# Patient Record
Sex: FEMALE | Race: OTHER | Employment: UNEMPLOYED | URBAN - NONMETROPOLITAN AREA
[De-identification: names, ages, dates, MRNs, and addresses within clinical notes are randomized per-mention and may not be internally consistent; named-entity substitution may affect disease eponyms.]

---

## 2020-06-08 ENCOUNTER — HOSPITAL ENCOUNTER (EMERGENCY)
Age: 25
Discharge: HOME | End: 2020-06-08
Payer: MEDICAID

## 2020-06-08 VITALS
HEART RATE: 75 BPM | WEIGHT: 127 LBS | RESPIRATION RATE: 16 BRPM | TEMPERATURE: 97.8 F | OXYGEN SATURATION: 98 % | BODY MASS INDEX: 27.4 KG/M2 | HEIGHT: 57 IN | SYSTOLIC BLOOD PRESSURE: 138 MMHG | DIASTOLIC BLOOD PRESSURE: 76 MMHG

## 2020-06-08 LAB
BILIRUBIN URINE: NEGATIVE
BLOOD, URINE: ABNORMAL
CHARACTER, URINE: CLEAR
COLOR: YELLOW
GLUCOSE URINE: NEGATIVE MG/DL
KETONES, URINE: NEGATIVE
LEUKOCYTE ESTERASE, URINE: ABNORMAL
NITRITE, URINE: NEGATIVE
PH UA: 6 (ref 5–9)
PROTEIN UA: NEGATIVE MG/DL
SPECIFIC GRAVITY UA: 1.01 (ref 1–1.03)
UROBILINOGEN, URINE: 0.2 EU/DL (ref 0.2–1)

## 2020-06-08 PROCEDURE — 99203 OFFICE O/P NEW LOW 30 MIN: CPT | Performed by: NURSE PRACTITIONER

## 2020-06-08 PROCEDURE — 99203 OFFICE O/P NEW LOW 30 MIN: CPT

## 2020-06-08 PROCEDURE — 81003 URINALYSIS AUTO W/O SCOPE: CPT

## 2020-06-08 PROCEDURE — 87086 URINE CULTURE/COLONY COUNT: CPT

## 2020-06-08 RX ORDER — ETONOGESTREL 68 MG/1
68 IMPLANT SUBCUTANEOUS ONCE
Status: ON HOLD | COMMUNITY
End: 2022-02-23

## 2020-06-08 RX ORDER — CIPROFLOXACIN 500 MG/1
500 TABLET, FILM COATED ORAL 2 TIMES DAILY
Qty: 10 TABLET | Refills: 0 | Status: SHIPPED | OUTPATIENT
Start: 2020-06-08 | End: 2020-06-13

## 2020-06-08 ASSESSMENT — PAIN SCALES - GENERAL: PAINLEVEL_OUTOF10: 5

## 2020-06-08 ASSESSMENT — ENCOUNTER SYMPTOMS
SHORTNESS OF BREATH: 0
COUGH: 0
VOMITING: 0
NAUSEA: 1
DIARRHEA: 0
BACK PAIN: 1
ABDOMINAL PAIN: 0

## 2020-06-08 ASSESSMENT — PAIN DESCRIPTION - ORIENTATION: ORIENTATION: RIGHT

## 2020-06-08 ASSESSMENT — PAIN DESCRIPTION - LOCATION: LOCATION: BACK;FLANK

## 2020-06-08 NOTE — ED PROVIDER NOTES
Urine Trace-lysed NEGATIVE    pH, UA 6.00 5.0 - 9.0    Protein, UA Negative NEGATIVE mg/dl    Urobilinogen, Urine 0.20 0.2 - 1.0 eu/dl    Nitrite, Urine Negative NEGATIVE    Leukocyte Esterase, Urine Small (A) NEGATIVE    Color, UA Yellow STRAW-YELL    Character, Urine Clear CLEAR-SL C       IMAGING:    No orders to display         EKG:  none    URGENT CARE COURSE:     Vitals:    06/08/20 1328   BP: 138/76   Pulse: 75   Resp: 16   Temp: 97.8 °F (36.6 °C)   TempSrc: Infrared   SpO2: 98%   Weight: 127 lb (57.6 kg)   Height: 4' 8.5\" (1.435 m)       Medications - No data to display         PROCEDURES:  None    FINAL IMPRESSION      1. Acute cystitis with hematuria          DISPOSITION/ PLAN     Discussed with the patient that urine sample does demonstrate the possibility of urinary tract infection at this time. Discussed plan to treat with oral antibiotics and she is advised to push fluids at home. I did discuss with the patient that if her symptoms seem to worsen or change in any way that she should present to the emergency department for further evaluation of kidney stone or other complication. Patient is agreeable to plan as discussed and will follow-up as needed on an outpatient basis. PATIENT REFERRED TO:  No primary care provider on file. No primary physician on file.       DISCHARGE MEDICATIONS:  New Prescriptions    CIPROFLOXACIN (CIPRO) 500 MG TABLET    Take 1 tablet by mouth 2 times daily for 5 days       Discontinued Medications    No medications on file       Current Discharge Medication List          GABBIE Garcia CNP    (Please note that portions of this note were completed with a voice recognition program. Efforts were made to edit the dictations but occasionally words are mis-transcribed.)          GABBIE Garcia CNP  06/08/20 [id number]

## 2020-06-10 LAB
ORGANISM: ABNORMAL
URINE CULTURE, ROUTINE: ABNORMAL

## 2021-05-21 ENCOUNTER — NURSE ONLY (OUTPATIENT)
Dept: LAB | Age: 26
End: 2021-05-21

## 2021-05-21 LAB — HCG,BETA SUBUNIT,QUAL,SERUM: < 0.1 MIU/ML (ref 0–5)

## 2021-05-26 ENCOUNTER — NURSE ONLY (OUTPATIENT)
Dept: LAB | Age: 26
End: 2021-05-26

## 2021-05-29 LAB
CARDIOLIPIN AB IGM: < 10 MPL (ref 0–12)
CARDIOLIPIN ANTIBODY, IGG: < 10 GPL (ref 0–14)
HSV TYPE 2 GLYCOPROTEIN G-SPECIFIC AB, IGG: 0.13 IV

## 2021-05-30 LAB
DRVVT 1:1 MIX: ABNORMAL SEC (ref 33–44)
DRVVT CONFIRMATION TEST: ABNORMAL RATIO
DRVVT SCREEN: 22 SEC (ref 33–44)
HEXAGONAL PHOSPHOLIPID NEUTRALIZAT TEST: ABNORMAL
LUPUS ANTICOAG INTERP: ABNORMAL
PLATELET NEUTRALIZATION: ABNORMAL
PROTEIN C ANTIGEN: > 95 % (ref 63–153)
PROTEIN S ANTIGEN, TOTAL: 101 % (ref 63–126)
PROTHROMBIN TIME: 12.1 SEC (ref 12–15.5)
PTT 1:1 MIX: ABNORMAL SEC (ref 32–48)
PTT LUPUS ANTICOAGULANT: 36 SEC (ref 32–48)
PTT-HEPARIN NEUTRALIZED: ABNORMAL SEC (ref 32–48)
REPTILASE TIME: ABNORMAL SEC
THROMBIN TIME: ABNORMAL SEC (ref 14.7–19.5)

## 2021-06-01 LAB — MISC. #1 REFERENCE GROUP TEST: NORMAL

## 2021-06-04 LAB — MISC. #1 REFERENCE GROUP TEST: NORMAL

## 2021-07-14 ENCOUNTER — NURSE ONLY (OUTPATIENT)
Dept: LAB | Age: 26
End: 2021-07-14

## 2021-07-14 LAB
HCG,BETA SUBUNIT,QUAL,SERUM: 17.9 MIU/ML (ref 0–5)
PROGESTERONE LEVEL: 11.85 NG/ML

## 2021-07-16 ENCOUNTER — NURSE ONLY (OUTPATIENT)
Dept: LAB | Age: 26
End: 2021-07-16

## 2021-07-16 LAB — HCG,BETA SUBUNIT,QUAL,SERUM: 61.8 MIU/ML (ref 0–5)

## 2021-08-16 ENCOUNTER — NURSE ONLY (OUTPATIENT)
Dept: LAB | Age: 26
End: 2021-08-16

## 2021-08-20 LAB
CHLAMYDIA TRACHOMATIS AMPLIFIED DET: NEGATIVE
NEISSERIA GONORRHOEAE BY AMP: NEGATIVE
SPECIMEN SOURCE: NORMAL

## 2021-08-21 LAB
APTIMA MEDIA TYPE: NORMAL
T. VAGINALIS SPECIMEN SOURCE: NORMAL
TRICHOMONAS VAGINALIS BY NAA: NEGATIVE

## 2022-01-07 ENCOUNTER — HOSPITAL ENCOUNTER (OUTPATIENT)
Age: 27
Discharge: HOME | End: 2022-01-07
Attending: OBSTETRICS & GYNECOLOGY | Admitting: OBSTETRICS & GYNECOLOGY
Payer: MEDICARE

## 2022-01-07 VITALS
RESPIRATION RATE: 16 BRPM | TEMPERATURE: 98.1 F | HEART RATE: 83 BPM | OXYGEN SATURATION: 97 % | SYSTOLIC BLOOD PRESSURE: 111 MMHG | DIASTOLIC BLOOD PRESSURE: 74 MMHG

## 2022-01-07 PROBLEM — O36.8131: Status: ACTIVE | Noted: 2022-01-07

## 2022-01-07 PROCEDURE — 59025 FETAL NON-STRESS TEST: CPT

## 2022-01-08 NOTE — PLAN OF CARE
Problem: Healthcare acquired conditions:  Goal: Absence of healthcare acquired conditions  Description: Absence of healthcare acquired conditions  Outcome: Ongoing  Note: Absence of healthcare acquired conditions      Problem: Discharge Planning:  Goal: Discharged to appropriate level of care  Description: Discharged to appropriate level of care  Outcome: Ongoing  Note: Plan of care discussed with pt      Care plan reviewed with patient and s/o. Patient and s/o verbalize understanding of the plan of care and contribute to goal setting.

## 2022-01-08 NOTE — FLOWSHEET NOTE
, 29 weeks, pt of Dr. Laney Sullivan here with c/o decreased fetal movement. Pt states she had a lot of fetal movement around 4801-0176 today and states baby has not been real active since. Pt states last fetal movement she felt was around 1800 today. Denies ctx, denies leaking of fluid or vaginal bleeding. Denies other complaints at this time. Fetal monitors applied to soft non tender abdomen.

## 2022-01-08 NOTE — FLOWSHEET NOTE
Dr. Carol Ann Garrett in department. Informed of pt arrival to unit. , 29 weeks, here with c/o decreased fetal movement. Reactive NST. No other complaints. Orders received to discharge pt to home.

## 2022-01-08 NOTE — FLOWSHEET NOTE
Discharge instructions given, pt verbalized understanding. Pt ambulated off unit with s/o at side. Discharged to home undelivered.

## 2022-01-10 NOTE — PROCEDURES
Department of Obstetrics and Gynecology  Labor and Delivery  NST Triage Note      Pt Name: Maria Del Carmen Moreno  MRN: [id number] Kimcaio #: [de-identified]  YOB: 1995  Procedure Performed By: Rodolfo Palomo MD, MD        SUBJECTIVE: Patient presented at 29 weeks complaining of decreased fetal movement. denies contraction, vaginal bleeding and leaking fluid. OBJECTIVE    Vitals:  /74   Pulse 83   Temp 98.1 °F (36.7 °C) (Temporal)   Resp 16   SpO2 97%     Fetal heart rate:         Baseline Heart Rate:  130        Accelerations:  present       Decelerations:  absent       Variability:  moderate    Contraction frequency: none    The NST was reactive. ASSESSMENT & PLAN:  Reassurance provided. Labor precautions given. Discharged to home in a stable condition and instructed to follow up at her next scheduled appointment.     Rodolfo Palomo MD 1/10/2022 11:30 AM

## 2022-01-12 ENCOUNTER — NURSE ONLY (OUTPATIENT)
Dept: LAB | Age: 27
End: 2022-01-12

## 2022-01-12 LAB
GLUCOSE FASTING: 103 MG/DL (ref 69–105)
GLUCOSE TOLERANCE TEST 1 HOUR: 190 MG/DL (ref 120–170)
GLUCOSE TOLERANCE TEST 2 HOUR: 203 MG/DL (ref 70–120)
GLUCOSE TOLERANCE TEST 3 HOUR: 167 MG/DL (ref 70–120)

## 2022-02-11 ENCOUNTER — HOSPITAL ENCOUNTER (OUTPATIENT)
Age: 27
Discharge: HOME | End: 2022-02-11
Attending: OBSTETRICS & GYNECOLOGY | Admitting: OBSTETRICS & GYNECOLOGY
Payer: MEDICARE

## 2022-02-11 VITALS
BODY MASS INDEX: 31.5 KG/M2 | SYSTOLIC BLOOD PRESSURE: 140 MMHG | TEMPERATURE: 100 F | HEIGHT: 57 IN | RESPIRATION RATE: 18 BRPM | WEIGHT: 146 LBS | HEART RATE: 96 BPM | DIASTOLIC BLOOD PRESSURE: 87 MMHG

## 2022-02-11 PROBLEM — O26.90 PREGNANCY COMPLICATION, ANTEPARTUM: Status: ACTIVE | Noted: 2022-02-11

## 2022-02-11 LAB
ALBUMIN SERPL-MCNC: 3.5 G/DL (ref 3.5–5.1)
ALP BLD-CCNC: 127 U/L (ref 38–126)
ALT SERPL-CCNC: 17 U/L (ref 11–66)
ANION GAP SERPL CALCULATED.3IONS-SCNC: 16 MEQ/L (ref 8–16)
AST SERPL-CCNC: 22 U/L (ref 5–40)
BACTERIA: ABNORMAL /HPF
BETA-HYDROXYBUTYRATE: 9.48 MG/DL (ref 0.2–2.81)
BILIRUB SERPL-MCNC: 0.2 MG/DL (ref 0.3–1.2)
BILIRUBIN URINE: NEGATIVE
BLOOD, URINE: NEGATIVE
BUN BLDV-MCNC: 6 MG/DL (ref 7–22)
CALCIUM SERPL-MCNC: 8.9 MG/DL (ref 8.5–10.5)
CASTS 2: ABNORMAL /LPF
CASTS UA: ABNORMAL /LPF
CHARACTER, URINE: CLEAR
CHLORIDE BLD-SCNC: 105 MEQ/L (ref 98–111)
CO2: 22 MEQ/L (ref 23–33)
COLOR: YELLOW
CREAT SERPL-MCNC: 0.4 MG/DL (ref 0.4–1.2)
CREATININE URINE: 175.2 MG/DL
CRYSTALS, UA: ABNORMAL
EPITHELIAL CELLS, UA: ABNORMAL /HPF
ERYTHROCYTE [DISTWIDTH] IN BLOOD BY AUTOMATED COUNT: 14.3 % (ref 11.5–14.5)
ERYTHROCYTE [DISTWIDTH] IN BLOOD BY AUTOMATED COUNT: 44.7 FL (ref 35–45)
GFR SERPL CREATININE-BSD FRML MDRD: > 90 ML/MIN/1.73M2
GLUCOSE BLD-MCNC: 71 MG/DL (ref 70–108)
GLUCOSE BLD-MCNC: 83 MG/DL (ref 70–108)
GLUCOSE URINE: NEGATIVE MG/DL
HCT VFR BLD CALC: 35.8 % (ref 37–47)
HEMOGLOBIN: 11.9 GM/DL (ref 12–16)
KETONES, URINE: >= 160
LEUKOCYTE ESTERASE, URINE: NEGATIVE
MCH RBC QN AUTO: 29.2 PG (ref 26–33)
MCHC RBC AUTO-ENTMCNC: 33.2 GM/DL (ref 32.2–35.5)
MCV RBC AUTO: 87.7 FL (ref 81–99)
MISCELLANEOUS 2: ABNORMAL
NITRITE, URINE: NEGATIVE
PH UA: 6.5 (ref 5–9)
PLATELET # BLD: 258 THOU/MM3 (ref 130–400)
PMV BLD AUTO: 10.1 FL (ref 9.4–12.4)
POTASSIUM SERPL-SCNC: 3.6 MEQ/L (ref 3.5–5.2)
PROT/CREAT RATIO, UR: 0.33
PROTEIN UA: ABNORMAL
PROTEIN, URINE: 57 MG/DL
RBC # BLD: 4.08 MILL/MM3 (ref 4.2–5.4)
RBC URINE: ABNORMAL /HPF
RENAL EPITHELIAL, UA: ABNORMAL
SODIUM BLD-SCNC: 143 MEQ/L (ref 135–145)
SPECIFIC GRAVITY, URINE: 1.02 (ref 1–1.03)
TOTAL PROTEIN: 5.3 G/DL (ref 6.1–8)
URIC ACID: 4.9 MG/DL (ref 2.4–5.7)
UROBILINOGEN, URINE: 0.2 EU/DL (ref 0–1)
WBC # BLD: 14.4 THOU/MM3 (ref 4.8–10.8)
WBC UA: ABNORMAL /HPF
YEAST: ABNORMAL

## 2022-02-11 PROCEDURE — 6360000002 HC RX W HCPCS: Performed by: OBSTETRICS & GYNECOLOGY

## 2022-02-11 PROCEDURE — 51701 INSERT BLADDER CATHETER: CPT

## 2022-02-11 PROCEDURE — 82570 ASSAY OF URINE CREATININE: CPT

## 2022-02-11 PROCEDURE — 96375 TX/PRO/DX INJ NEW DRUG ADDON: CPT

## 2022-02-11 PROCEDURE — 2580000003 HC RX 258: Performed by: OBSTETRICS & GYNECOLOGY

## 2022-02-11 PROCEDURE — 82948 REAGENT STRIP/BLOOD GLUCOSE: CPT

## 2022-02-11 PROCEDURE — 84550 ASSAY OF BLOOD/URIC ACID: CPT

## 2022-02-11 PROCEDURE — 85027 COMPLETE CBC AUTOMATED: CPT

## 2022-02-11 PROCEDURE — 82010 KETONE BODYS QUAN: CPT

## 2022-02-11 PROCEDURE — 81001 URINALYSIS AUTO W/SCOPE: CPT

## 2022-02-11 PROCEDURE — 96374 THER/PROPH/DIAG INJ IV PUSH: CPT

## 2022-02-11 PROCEDURE — 80053 COMPREHEN METABOLIC PANEL: CPT

## 2022-02-11 PROCEDURE — 6370000000 HC RX 637 (ALT 250 FOR IP): Performed by: OBSTETRICS & GYNECOLOGY

## 2022-02-11 PROCEDURE — 84156 ASSAY OF PROTEIN URINE: CPT

## 2022-02-11 RX ORDER — ASPIRIN 81 MG/1
81 TABLET ORAL DAILY
COMMUNITY
End: 2022-10-04

## 2022-02-11 RX ORDER — ONDANSETRON 2 MG/ML
8 INJECTION INTRAMUSCULAR; INTRAVENOUS EVERY 6 HOURS PRN
Status: DISCONTINUED | OUTPATIENT
Start: 2022-02-11 | End: 2022-02-11 | Stop reason: HOSPADM

## 2022-02-11 RX ORDER — SODIUM CHLORIDE 9 MG/ML
INJECTION, SOLUTION INTRAVENOUS CONTINUOUS
Status: DISCONTINUED | OUTPATIENT
Start: 2022-02-11 | End: 2022-02-11 | Stop reason: HOSPADM

## 2022-02-11 RX ORDER — ACETAMINOPHEN 500 MG
1000 TABLET ORAL EVERY 6 HOURS PRN
Status: DISCONTINUED | OUTPATIENT
Start: 2022-02-11 | End: 2022-02-11 | Stop reason: HOSPADM

## 2022-02-11 RX ORDER — METOCLOPRAMIDE HYDROCHLORIDE 5 MG/ML
10 INJECTION INTRAMUSCULAR; INTRAVENOUS ONCE
Status: COMPLETED | OUTPATIENT
Start: 2022-02-11 | End: 2022-02-11

## 2022-02-11 RX ADMIN — SODIUM CHLORIDE: 9 INJECTION, SOLUTION INTRAVENOUS at 17:07

## 2022-02-11 RX ADMIN — METOCLOPRAMIDE 10 MG: 5 INJECTION, SOLUTION INTRAMUSCULAR; INTRAVENOUS at 18:06

## 2022-02-11 RX ADMIN — SODIUM CHLORIDE: 9 INJECTION, SOLUTION INTRAVENOUS at 18:19

## 2022-02-11 RX ADMIN — SODIUM CHLORIDE: 9 INJECTION, SOLUTION INTRAVENOUS at 15:18

## 2022-02-11 RX ADMIN — ACETAMINOPHEN 1000 MG: 500 TABLET ORAL at 18:04

## 2022-02-11 RX ADMIN — ONDANSETRON 8 MG: 2 INJECTION INTRAMUSCULAR; INTRAVENOUS at 15:38

## 2022-02-11 ASSESSMENT — PAIN SCALES - GENERAL: PAINLEVEL_OUTOF10: 0

## 2022-02-11 NOTE — FLOWSHEET NOTE
Discussed UA results w Dr. Davey Saleh. MD not concerned with ketone levels d/t pt being dehydrated. Notified MD pt's nausea/vomiting worse before Reglan given. MD stated patient's temperature to be rechecked at 1900 and update MD at [address]. Pt diet to be changed to ice chips.

## 2022-02-11 NOTE — FLOWSHEET NOTE
Dr. Carol Ann Garrett notified of pt's elevated beta hydroxybutyrate level. MD did not express concern d/t pt being dehydrated.

## 2022-02-11 NOTE — FLOWSHEET NOTE
Dr. Christine Pollack updated regarding lab results. Notified MD patient had 100.5 oral temp and nausea is worse. MD ordered reglan IV, tylenol PO, and straight catheterized UA with culture to be sent.

## 2022-02-11 NOTE — FLOWSHEET NOTE
Patient of Dr. Cindi Farmer,  at 34+0 weeks admitted for severe nuasea/vomiting. Patient ambulated from office with . Dr. Almon Seton called to notify of admission and gave orders. Patient having irregular contractions, denies leaking of fluid or vaginal bleeding. Patient states feeling fetal movement. Patient to bathroom to void, informed of maternal drug testing policy in place on all laboring patients. Verbal consent received, paper consent to be signed and urine to be sent if applicable.

## 2022-02-12 NOTE — PLAN OF CARE
Problem: Healthcare acquired conditions:  Goal: Absence of healthcare acquired conditions  Description: Absence of healthcare acquired conditions  Outcome: Ongoing  Note: Elevated temperature and BP. MD notified. Problem: Discharge Planning:  Goal: Discharged to appropriate level of care  Description: Discharged to appropriate level of care  Outcome: Ongoing  Note: Pt to be discharged home. Care plan reviewed with patient and . Patient and  verbalize understanding of the plan of care and contribute to goal setting.

## 2022-02-23 ENCOUNTER — APPOINTMENT (OUTPATIENT)
Dept: ULTRASOUND IMAGING | Age: 27
DRG: 540 | End: 2022-02-23
Payer: MEDICARE

## 2022-02-23 ENCOUNTER — HOSPITAL ENCOUNTER (INPATIENT)
Age: 27
LOS: 5 days | Discharge: HOME | DRG: 540 | End: 2022-02-28
Attending: OBSTETRICS & GYNECOLOGY | Admitting: OBSTETRICS & GYNECOLOGY
Payer: MEDICARE

## 2022-02-23 PROBLEM — O14.93 PREECLAMPSIA, THIRD TRIMESTER: Status: ACTIVE | Noted: 2022-02-23

## 2022-02-23 PROBLEM — O16.3 HIGH BLOOD PRESSURE AFFECTING PREGNANCY IN THIRD TRIMESTER, ANTEPARTUM: Status: ACTIVE | Noted: 2022-02-23

## 2022-02-23 LAB
ALBUMIN SERPL-MCNC: 3.2 G/DL (ref 3.5–5.1)
ALP BLD-CCNC: 127 U/L (ref 38–126)
ALT SERPL-CCNC: 22 U/L (ref 11–66)
AMPHETAMINE+METHAMPHETAMINE URINE SCREEN: NEGATIVE
ANION GAP SERPL CALCULATED.3IONS-SCNC: 10 MEQ/L (ref 8–16)
AST SERPL-CCNC: 19 U/L (ref 5–40)
BARBITURATE QUANTITATIVE URINE: NEGATIVE
BENZODIAZEPINE QUANTITATIVE URINE: NEGATIVE
BILIRUB SERPL-MCNC: 0.2 MG/DL (ref 0.3–1.2)
BUN BLDV-MCNC: 4 MG/DL (ref 7–22)
CALCIUM SERPL-MCNC: 8.8 MG/DL (ref 8.5–10.5)
CANNABINOID QUANTITATIVE URINE: NEGATIVE
CHLORIDE BLD-SCNC: 104 MEQ/L (ref 98–111)
CO2: 24 MEQ/L (ref 23–33)
COCAINE METABOLITE QUANTITATIVE URINE: NEGATIVE
CREAT SERPL-MCNC: 0.4 MG/DL (ref 0.4–1.2)
CREATININE URINE: 86.2 MG/DL
ERYTHROCYTE [DISTWIDTH] IN BLOOD BY AUTOMATED COUNT: 13.4 % (ref 11.5–14.5)
ERYTHROCYTE [DISTWIDTH] IN BLOOD BY AUTOMATED COUNT: 41.1 FL (ref 35–45)
GFR SERPL CREATININE-BSD FRML MDRD: > 90 ML/MIN/1.73M2
GLUCOSE BLD-MCNC: 105 MG/DL (ref 70–108)
GLUCOSE BLD-MCNC: 179 MG/DL (ref 70–108)
HCT VFR BLD CALC: 30.8 % (ref 37–47)
HEMOGLOBIN: 10.3 GM/DL (ref 12–16)
MCH RBC QN AUTO: 28.1 PG (ref 26–33)
MCHC RBC AUTO-ENTMCNC: 33.4 GM/DL (ref 32.2–35.5)
MCV RBC AUTO: 84.2 FL (ref 81–99)
OPIATES, URINE: NEGATIVE
OXYCODONE: NEGATIVE
PHENCYCLIDINE QUANTITATIVE URINE: NEGATIVE
PLATELET # BLD: 300 THOU/MM3 (ref 130–400)
PMV BLD AUTO: 9.6 FL (ref 9.4–12.4)
POTASSIUM SERPL-SCNC: 3.3 MEQ/L (ref 3.5–5.2)
PROT/CREAT RATIO, UR: 0.77
PROTEIN, URINE: 66.5 MG/DL
RBC # BLD: 3.66 MILL/MM3 (ref 4.2–5.4)
SODIUM BLD-SCNC: 138 MEQ/L (ref 135–145)
TOTAL PROTEIN: 5.7 G/DL (ref 6.1–8)
WBC # BLD: 8.9 THOU/MM3 (ref 4.8–10.8)

## 2022-02-23 PROCEDURE — 84156 ASSAY OF PROTEIN URINE: CPT

## 2022-02-23 PROCEDURE — 6370000000 HC RX 637 (ALT 250 FOR IP): Performed by: OBSTETRICS & GYNECOLOGY

## 2022-02-23 PROCEDURE — 36415 COLL VENOUS BLD VENIPUNCTURE: CPT

## 2022-02-23 PROCEDURE — 87081 CULTURE SCREEN ONLY: CPT

## 2022-02-23 PROCEDURE — 6360000002 HC RX W HCPCS: Performed by: OBSTETRICS & GYNECOLOGY

## 2022-02-23 PROCEDURE — 1220000001 HC SEMI PRIVATE L&D R&B

## 2022-02-23 PROCEDURE — 86901 BLOOD TYPING SEROLOGIC RH(D): CPT

## 2022-02-23 PROCEDURE — 86592 SYPHILIS TEST NON-TREP QUAL: CPT

## 2022-02-23 PROCEDURE — 82948 REAGENT STRIP/BLOOD GLUCOSE: CPT

## 2022-02-23 PROCEDURE — 80053 COMPREHEN METABOLIC PANEL: CPT

## 2022-02-23 PROCEDURE — 86850 RBC ANTIBODY SCREEN: CPT

## 2022-02-23 PROCEDURE — 6360000002 HC RX W HCPCS

## 2022-02-23 PROCEDURE — 76815 OB US LIMITED FETUS(S): CPT

## 2022-02-23 PROCEDURE — 86900 BLOOD TYPING SEROLOGIC ABO: CPT

## 2022-02-23 PROCEDURE — 87653 STREP B DNA AMP PROBE: CPT

## 2022-02-23 PROCEDURE — 85027 COMPLETE CBC AUTOMATED: CPT

## 2022-02-23 PROCEDURE — 82570 ASSAY OF URINE CREATININE: CPT

## 2022-02-23 PROCEDURE — 80307 DRUG TEST PRSMV CHEM ANLYZR: CPT

## 2022-02-23 RX ORDER — BUTALBITAL, ACETAMINOPHEN AND CAFFEINE 50; 325; 40 MG/1; MG/1; MG/1
1 TABLET ORAL EVERY 4 HOURS PRN
Status: DISCONTINUED | OUTPATIENT
Start: 2022-02-23 | End: 2022-02-25

## 2022-02-23 RX ORDER — BETAMETHASONE SODIUM PHOSPHATE AND BETAMETHASONE ACETATE 3; 3 MG/ML; MG/ML
12 INJECTION, SUSPENSION INTRA-ARTICULAR; INTRALESIONAL; INTRAMUSCULAR; SOFT TISSUE ONCE
Status: COMPLETED | OUTPATIENT
Start: 2022-02-23 | End: 2022-02-23

## 2022-02-23 RX ORDER — BUTORPHANOL TARTRATE 1 MG/ML
1 INJECTION, SOLUTION INTRAMUSCULAR; INTRAVENOUS
Status: DISCONTINUED | OUTPATIENT
Start: 2022-02-23 | End: 2022-02-25

## 2022-02-23 RX ORDER — ACETAMINOPHEN 325 MG/1
650 TABLET ORAL EVERY 4 HOURS PRN
Status: DISCONTINUED | OUTPATIENT
Start: 2022-02-23 | End: 2022-02-25

## 2022-02-23 RX ORDER — ONDANSETRON 2 MG/ML
8 INJECTION INTRAMUSCULAR; INTRAVENOUS EVERY 8 HOURS PRN
Status: DISCONTINUED | OUTPATIENT
Start: 2022-02-23 | End: 2022-02-25

## 2022-02-23 RX ORDER — ACETAMINOPHEN 500 MG
500 TABLET ORAL EVERY 6 HOURS PRN
COMMUNITY
End: 2022-10-04

## 2022-02-23 RX ORDER — SODIUM CHLORIDE, SODIUM LACTATE, POTASSIUM CHLORIDE, AND CALCIUM CHLORIDE .6; .31; .03; .02 G/100ML; G/100ML; G/100ML; G/100ML
500 INJECTION, SOLUTION INTRAVENOUS PRN
Status: DISCONTINUED | OUTPATIENT
Start: 2022-02-23 | End: 2022-02-25

## 2022-02-23 RX ORDER — TERBUTALINE SULFATE 1 MG/ML
0.25 INJECTION, SOLUTION SUBCUTANEOUS ONCE
Status: DISCONTINUED | OUTPATIENT
Start: 2022-02-23 | End: 2022-02-25

## 2022-02-23 RX ORDER — HYDRALAZINE HYDROCHLORIDE 20 MG/ML
5 INJECTION INTRAMUSCULAR; INTRAVENOUS ONCE
Status: COMPLETED | OUTPATIENT
Start: 2022-02-23 | End: 2022-02-23

## 2022-02-23 RX ORDER — HYDRALAZINE HYDROCHLORIDE 20 MG/ML
10 INJECTION INTRAMUSCULAR; INTRAVENOUS ONCE
Status: COMPLETED | OUTPATIENT
Start: 2022-02-23 | End: 2022-02-23

## 2022-02-23 RX ORDER — IBUPROFEN 800 MG/1
800 TABLET ORAL EVERY 8 HOURS PRN
Status: DISCONTINUED | OUTPATIENT
Start: 2022-02-23 | End: 2022-02-25

## 2022-02-23 RX ORDER — SODIUM CHLORIDE, SODIUM LACTATE, POTASSIUM CHLORIDE, AND CALCIUM CHLORIDE .6; .31; .03; .02 G/100ML; G/100ML; G/100ML; G/100ML
1000 INJECTION, SOLUTION INTRAVENOUS PRN
Status: DISCONTINUED | OUTPATIENT
Start: 2022-02-23 | End: 2022-02-25

## 2022-02-23 RX ORDER — CEFAZOLIN SODIUM 1 G/50ML
1000 INJECTION, SOLUTION INTRAVENOUS EVERY 8 HOURS
Status: DISCONTINUED | OUTPATIENT
Start: 2022-02-24 | End: 2022-02-24

## 2022-02-23 RX ORDER — MAGNESIUM SULFATE IN WATER 40 MG/ML
4000 INJECTION, SOLUTION INTRAVENOUS ONCE
Status: COMPLETED | OUTPATIENT
Start: 2022-02-23 | End: 2022-02-23

## 2022-02-23 RX ORDER — DIPHENHYDRAMINE HYDROCHLORIDE 50 MG/ML
25 INJECTION INTRAMUSCULAR; INTRAVENOUS EVERY 4 HOURS PRN
Status: DISCONTINUED | OUTPATIENT
Start: 2022-02-23 | End: 2022-02-25

## 2022-02-23 RX ORDER — SEVOFLURANE 250 ML/250ML
1 LIQUID RESPIRATORY (INHALATION) CONTINUOUS PRN
Status: DISCONTINUED | OUTPATIENT
Start: 2022-02-23 | End: 2022-02-25

## 2022-02-23 RX ORDER — CALCIUM GLUCONATE 94 MG/ML
1000 INJECTION, SOLUTION INTRAVENOUS PRN
Status: DISCONTINUED | OUTPATIENT
Start: 2022-02-23 | End: 2022-02-25

## 2022-02-23 RX ORDER — MISOPROSTOL 200 UG/1
1000 TABLET ORAL PRN
Status: DISCONTINUED | OUTPATIENT
Start: 2022-02-23 | End: 2022-02-25

## 2022-02-23 RX ORDER — LIDOCAINE HYDROCHLORIDE 10 MG/ML
30 INJECTION, SOLUTION EPIDURAL; INFILTRATION; INTRACAUDAL; PERINEURAL PRN
Status: DISCONTINUED | OUTPATIENT
Start: 2022-02-23 | End: 2022-02-25

## 2022-02-23 RX ORDER — CARBOPROST TROMETHAMINE 250 UG/ML
250 INJECTION, SOLUTION INTRAMUSCULAR PRN
Status: DISCONTINUED | OUTPATIENT
Start: 2022-02-23 | End: 2022-02-25

## 2022-02-23 RX ORDER — SODIUM CHLORIDE, SODIUM LACTATE, POTASSIUM CHLORIDE, CALCIUM CHLORIDE 600; 310; 30; 20 MG/100ML; MG/100ML; MG/100ML; MG/100ML
INJECTION, SOLUTION INTRAVENOUS CONTINUOUS
Status: DISCONTINUED | OUTPATIENT
Start: 2022-02-23 | End: 2022-02-25

## 2022-02-23 RX ORDER — DOCUSATE SODIUM 100 MG/1
100 CAPSULE, LIQUID FILLED ORAL 2 TIMES DAILY PRN
Status: DISCONTINUED | OUTPATIENT
Start: 2022-02-23 | End: 2022-02-25

## 2022-02-23 RX ORDER — HYDRALAZINE HYDROCHLORIDE 20 MG/ML
INJECTION INTRAMUSCULAR; INTRAVENOUS
Status: COMPLETED
Start: 2022-02-23 | End: 2022-02-23

## 2022-02-23 RX ORDER — CEFAZOLIN SODIUM 2 G/100ML
2000 INJECTION, SOLUTION INTRAVENOUS ONCE
Status: COMPLETED | OUTPATIENT
Start: 2022-02-23 | End: 2022-02-24

## 2022-02-23 RX ADMIN — MAGNESIUM SULFATE IN WATER 2000 MG/HR: 40 INJECTION, SOLUTION INTRAVENOUS at 22:08

## 2022-02-23 RX ADMIN — HYDRALAZINE HYDROCHLORIDE 10 MG: 20 INJECTION INTRAMUSCULAR; INTRAVENOUS at 22:50

## 2022-02-23 RX ADMIN — HYDRALAZINE HYDROCHLORIDE 5 MG: 20 INJECTION INTRAMUSCULAR; INTRAVENOUS at 21:31

## 2022-02-23 RX ADMIN — BETAMETHASONE SODIUM PHOSPHATE AND BETAMETHASONE ACETATE 12 MG: 3; 3 INJECTION, SUSPENSION INTRA-ARTICULAR; INTRALESIONAL; INTRAMUSCULAR at 19:21

## 2022-02-23 RX ADMIN — BUTALBITAL, ACETAMINOPHEN, AND CAFFEINE 1 TABLET: 50; 325; 40 TABLET ORAL at 19:21

## 2022-02-23 RX ADMIN — Medication 25 MCG: at 22:15

## 2022-02-23 RX ADMIN — MAGNESIUM SULFATE HEPTAHYDRATE 4000 MG: 40 INJECTION, SOLUTION INTRAVENOUS at 21:48

## 2022-02-23 ASSESSMENT — PAIN SCALES - GENERAL: PAINLEVEL_OUTOF10: 4

## 2022-02-23 NOTE — FLOWSHEET NOTE
Patient arrived to the unit on foot from home with c/o headache that she has been having all day. She has no leakage of fluid, no bleeding, no c/o ctx and she is having positive fetal movement. Patient oriented to room, gown provided, call light within reach. Patient to bathroom to void, informed of maternal drug testing policy in place on all laboring patients. Verbal consent received, paper consent to be signed and urine to be sent. Explained patients right to have family, representative or physician notified of their admission. Patient has Declined for physician to be notified. Patient has Declined for family/representative to be notified.

## 2022-02-23 NOTE — FLOWSHEET NOTE
Dr. Lisa Silva updated on  35w5d patient who came in with c/o headache. She has no visual disturbances. Reflexes are 3+ with no clonus and the patient states she is having epigastric pain that comes and goes. Patient last took tylenol this morning but states it did not help her headache. Blood pressures reviewed. Patient has no c/o leakage of fluid, bleeding or ctx and she is still feeling baby move. Baby is reactive, abdomen is soft and non tender. Patient states she has had high blood pressures this pregnancy but is not on any meds for them. Labs reviewed from 22. Patient is supposed to see Dr. Nadia Westfall tomorrow. Orders received.

## 2022-02-24 ENCOUNTER — ANESTHESIA EVENT (OUTPATIENT)
Dept: LABOR AND DELIVERY | Age: 27
DRG: 540 | End: 2022-02-24
Payer: MEDICARE

## 2022-02-24 ENCOUNTER — ANESTHESIA (OUTPATIENT)
Dept: LABOR AND DELIVERY | Age: 27
DRG: 540 | End: 2022-02-24
Payer: MEDICARE

## 2022-02-24 LAB
ABO: NORMAL
ANTIBODY SCREEN: NORMAL
GLUCOSE BLD-MCNC: 107 MG/DL (ref 70–108)
GLUCOSE BLD-MCNC: 112 MG/DL (ref 70–108)
GLUCOSE BLD-MCNC: 112 MG/DL (ref 70–108)
GLUCOSE BLD-MCNC: 114 MG/DL (ref 70–108)
GLUCOSE BLD-MCNC: 116 MG/DL (ref 70–108)
GLUCOSE BLD-MCNC: 121 MG/DL (ref 70–108)
GLUCOSE BLD-MCNC: 123 MG/DL (ref 70–108)
GLUCOSE BLD-MCNC: 123 MG/DL (ref 70–108)
GLUCOSE BLD-MCNC: 124 MG/DL (ref 70–108)
GLUCOSE BLD-MCNC: 128 MG/DL (ref 70–108)
GLUCOSE BLD-MCNC: 129 MG/DL (ref 70–108)
GLUCOSE BLD-MCNC: 132 MG/DL (ref 70–108)
GLUCOSE BLD-MCNC: 134 MG/DL (ref 70–108)
GLUCOSE BLD-MCNC: 134 MG/DL (ref 70–108)
GLUCOSE BLD-MCNC: 135 MG/DL (ref 70–108)
GLUCOSE BLD-MCNC: 138 MG/DL (ref 70–108)
GLUCOSE BLD-MCNC: 139 MG/DL (ref 70–108)
GLUCOSE BLD-MCNC: 141 MG/DL (ref 70–108)
GLUCOSE BLD-MCNC: 148 MG/DL (ref 70–108)
GLUCOSE BLD-MCNC: 155 MG/DL (ref 70–108)
GLUCOSE BLD-MCNC: 202 MG/DL (ref 70–108)
RH FACTOR: NORMAL
RPR: NONREACTIVE

## 2022-02-24 PROCEDURE — 6360000002 HC RX W HCPCS

## 2022-02-24 PROCEDURE — 3700000025 EPIDURAL BLOCK: Performed by: STUDENT IN AN ORGANIZED HEALTH CARE EDUCATION/TRAINING PROGRAM

## 2022-02-24 PROCEDURE — 2500000003 HC RX 250 WO HCPCS: Performed by: ANESTHESIOLOGY

## 2022-02-24 PROCEDURE — 2500000003 HC RX 250 WO HCPCS: Performed by: OBSTETRICS & GYNECOLOGY

## 2022-02-24 PROCEDURE — 6370000000 HC RX 637 (ALT 250 FOR IP): Performed by: OBSTETRICS & GYNECOLOGY

## 2022-02-24 PROCEDURE — 2500000003 HC RX 250 WO HCPCS

## 2022-02-24 PROCEDURE — 6360000002 HC RX W HCPCS: Performed by: OBSTETRICS & GYNECOLOGY

## 2022-02-24 PROCEDURE — 2580000003 HC RX 258: Performed by: OBSTETRICS & GYNECOLOGY

## 2022-02-24 PROCEDURE — 82948 REAGENT STRIP/BLOOD GLUCOSE: CPT

## 2022-02-24 PROCEDURE — 6360000002 HC RX W HCPCS: Performed by: ANESTHESIOLOGY

## 2022-02-24 PROCEDURE — 2500000003 HC RX 250 WO HCPCS: Performed by: NURSE ANESTHETIST, CERTIFIED REGISTERED

## 2022-02-24 PROCEDURE — 3E0P7VZ INTRODUCTION OF HORMONE INTO FEMALE REPRODUCTIVE, VIA NATURAL OR ARTIFICIAL OPENING: ICD-10-PCS | Performed by: OBSTETRICS & GYNECOLOGY

## 2022-02-24 PROCEDURE — 1220000001 HC SEMI PRIVATE L&D R&B

## 2022-02-24 RX ORDER — SODIUM CHLORIDE, SODIUM LACTATE, POTASSIUM CHLORIDE, CALCIUM CHLORIDE 600; 310; 30; 20 MG/100ML; MG/100ML; MG/100ML; MG/100ML
INJECTION, SOLUTION INTRAVENOUS CONTINUOUS
Status: DISCONTINUED | OUTPATIENT
Start: 2022-02-24 | End: 2022-02-25

## 2022-02-24 RX ORDER — FENTANYL CITRATE 50 UG/ML
INJECTION, SOLUTION INTRAMUSCULAR; INTRAVENOUS
Status: COMPLETED
Start: 2022-02-24 | End: 2022-02-24

## 2022-02-24 RX ORDER — ACETAMINOPHEN 325 MG/1
975 TABLET ORAL ONCE
Status: COMPLETED | OUTPATIENT
Start: 2022-02-24 | End: 2022-02-24

## 2022-02-24 RX ORDER — ROPIVACAINE HYDROCHLORIDE 2 MG/ML
INJECTION, SOLUTION EPIDURAL; INFILTRATION; PERINEURAL
Status: COMPLETED
Start: 2022-02-24 | End: 2022-02-24

## 2022-02-24 RX ORDER — HYDRALAZINE HYDROCHLORIDE 20 MG/ML
10 INJECTION INTRAMUSCULAR; INTRAVENOUS ONCE
Status: COMPLETED | OUTPATIENT
Start: 2022-02-24 | End: 2022-02-24

## 2022-02-24 RX ORDER — EPHEDRINE SULFATE/0.9% NACL/PF 50 MG/5 ML
5 SYRINGE (ML) INTRAVENOUS PRN
Status: DISCONTINUED | OUTPATIENT
Start: 2022-02-24 | End: 2022-02-25

## 2022-02-24 RX ORDER — ONDANSETRON 2 MG/ML
4 INJECTION INTRAMUSCULAR; INTRAVENOUS EVERY 6 HOURS PRN
Status: DISCONTINUED | OUTPATIENT
Start: 2022-02-24 | End: 2022-02-25

## 2022-02-24 RX ORDER — FENTANYL CITRATE 50 UG/ML
INJECTION, SOLUTION INTRAMUSCULAR; INTRAVENOUS PRN
Status: DISCONTINUED | OUTPATIENT
Start: 2022-02-24 | End: 2022-02-25 | Stop reason: SDUPTHER

## 2022-02-24 RX ORDER — METOCLOPRAMIDE HYDROCHLORIDE 5 MG/ML
10 INJECTION INTRAMUSCULAR; INTRAVENOUS ONCE
Status: COMPLETED | OUTPATIENT
Start: 2022-02-24 | End: 2022-02-24

## 2022-02-24 RX ORDER — CEFAZOLIN SODIUM 1 G/50ML
1000 INJECTION, SOLUTION INTRAVENOUS ONCE
Status: COMPLETED | OUTPATIENT
Start: 2022-02-24 | End: 2022-02-25

## 2022-02-24 RX ORDER — ROPIVACAINE HYDROCHLORIDE 2 MG/ML
INJECTION, SOLUTION EPIDURAL; INFILTRATION; PERINEURAL PRN
Status: DISCONTINUED | OUTPATIENT
Start: 2022-02-24 | End: 2022-02-25 | Stop reason: SDUPTHER

## 2022-02-24 RX ORDER — HYDRALAZINE HYDROCHLORIDE 20 MG/ML
INJECTION INTRAMUSCULAR; INTRAVENOUS
Status: COMPLETED
Start: 2022-02-24 | End: 2022-02-24

## 2022-02-24 RX ORDER — TRISODIUM CITRATE DIHYDRATE AND CITRIC ACID MONOHYDRATE 500; 334 MG/5ML; MG/5ML
15 SOLUTION ORAL ONCE
Status: COMPLETED | OUTPATIENT
Start: 2022-02-24 | End: 2022-02-24

## 2022-02-24 RX ADMIN — HYDRALAZINE HYDROCHLORIDE 10 MG: 20 INJECTION INTRAMUSCULAR; INTRAVENOUS at 06:52

## 2022-02-24 RX ADMIN — CEFAZOLIN 1000 MG: 1 INJECTION, POWDER, FOR SOLUTION INTRAMUSCULAR; INTRAVENOUS at 09:16

## 2022-02-24 RX ADMIN — CEFAZOLIN 1000 MG: 1 INJECTION, POWDER, FOR SOLUTION INTRAMUSCULAR; INTRAVENOUS at 17:03

## 2022-02-24 RX ADMIN — LIDOCAINE HYDROCHLORIDE,EPINEPHRINE BITARTRATE 5 ML: 20; .005 INJECTION, SOLUTION EPIDURAL; INFILTRATION; INTRACAUDAL; PERINEURAL at 23:45

## 2022-02-24 RX ADMIN — ONDANSETRON 8 MG: 2 INJECTION INTRAMUSCULAR; INTRAVENOUS at 21:15

## 2022-02-24 RX ADMIN — SODIUM CHLORIDE, POTASSIUM CHLORIDE, SODIUM LACTATE AND CALCIUM CHLORIDE: 600; 310; 30; 20 INJECTION, SOLUTION INTRAVENOUS at 10:36

## 2022-02-24 RX ADMIN — CEFAZOLIN SODIUM 2000 MG: 2 INJECTION, SOLUTION INTRAVENOUS at 01:11

## 2022-02-24 RX ADMIN — BUTORPHANOL TARTRATE 1 MG: 1 INJECTION, SOLUTION INTRAMUSCULAR; INTRAVENOUS at 09:14

## 2022-02-24 RX ADMIN — ONDANSETRON 8 MG: 2 INJECTION INTRAMUSCULAR; INTRAVENOUS at 09:10

## 2022-02-24 RX ADMIN — Medication 25 MCG: at 06:06

## 2022-02-24 RX ADMIN — MAGNESIUM SULFATE IN WATER 2000 MG/HR: 40 INJECTION, SOLUTION INTRAVENOUS at 08:02

## 2022-02-24 RX ADMIN — FAMOTIDINE 20 MG: 10 INJECTION, SOLUTION INTRAVENOUS at 23:52

## 2022-02-24 RX ADMIN — ACETAMINOPHEN 975 MG: 325 TABLET ORAL at 23:48

## 2022-02-24 RX ADMIN — FENTANYL CITRATE 100 MCG: 50 INJECTION, SOLUTION INTRAMUSCULAR; INTRAVENOUS at 18:01

## 2022-02-24 RX ADMIN — SODIUM CITRATE AND CITRIC ACID MONOHYDRATE 15 ML: 500; 334 SOLUTION ORAL at 23:53

## 2022-02-24 RX ADMIN — INSULIN LISPRO 1 UNITS: 100 INJECTION, SOLUTION INTRAVENOUS; SUBCUTANEOUS at 04:08

## 2022-02-24 RX ADMIN — Medication 18 ML/HR: at 11:30

## 2022-02-24 RX ADMIN — CEFAZOLIN SODIUM 1000 MG: 1 INJECTION, SOLUTION INTRAVENOUS at 23:57

## 2022-02-24 RX ADMIN — FENTANYL CITRATE 100 MCG: 50 INJECTION, SOLUTION INTRAMUSCULAR; INTRAVENOUS at 21:34

## 2022-02-24 RX ADMIN — LIDOCAINE HYDROCHLORIDE,EPINEPHRINE BITARTRATE 5 ML: 20; .005 INJECTION, SOLUTION EPIDURAL; INFILTRATION; INTRACAUDAL; PERINEURAL at 23:35

## 2022-02-24 RX ADMIN — INSULIN LISPRO 1 UNITS: 100 INJECTION, SOLUTION INTRAVENOUS; SUBCUTANEOUS at 08:07

## 2022-02-24 RX ADMIN — INSULIN LISPRO 2 UNITS: 100 INJECTION, SOLUTION INTRAVENOUS; SUBCUTANEOUS at 02:24

## 2022-02-24 RX ADMIN — HYDRALAZINE HYDROCHLORIDE 10 MG: 20 INJECTION INTRAMUSCULAR; INTRAVENOUS at 23:34

## 2022-02-24 RX ADMIN — ROPIVACAINE HYDROCHLORIDE 10 ML: 2 INJECTION, SOLUTION EPIDURAL; INFILTRATION at 15:30

## 2022-02-24 RX ADMIN — ROPIVACAINE HYDROCHLORIDE 10 ML: 2 INJECTION, SOLUTION EPIDURAL; INFILTRATION at 21:34

## 2022-02-24 RX ADMIN — Medication 25 MCG: at 02:12

## 2022-02-24 RX ADMIN — MAGNESIUM SULFATE IN WATER 2000 MG/HR: 40 INJECTION, SOLUTION INTRAVENOUS at 18:30

## 2022-02-24 RX ADMIN — ROPIVACAINE HYDROCHLORIDE 5 ML: 2 INJECTION, SOLUTION EPIDURAL; INFILTRATION at 19:12

## 2022-02-24 RX ADMIN — ROPIVACAINE HYDROCHLORIDE 5 ML: 2 INJECTION, SOLUTION EPIDURAL; INFILTRATION at 18:01

## 2022-02-24 RX ADMIN — LIDOCAINE HYDROCHLORIDE,EPINEPHRINE BITARTRATE 5 ML: 20; .005 INJECTION, SOLUTION EPIDURAL; INFILTRATION; INTRACAUDAL; PERINEURAL at 23:40

## 2022-02-24 RX ADMIN — SODIUM CHLORIDE, POTASSIUM CHLORIDE, SODIUM LACTATE AND CALCIUM CHLORIDE: 600; 310; 30; 20 INJECTION, SOLUTION INTRAVENOUS at 23:47

## 2022-02-24 RX ADMIN — METOCLOPRAMIDE 10 MG: 5 INJECTION, SOLUTION INTRAMUSCULAR; INTRAVENOUS at 23:49

## 2022-02-24 RX ADMIN — FENTANYL CITRATE 100 MCG: 50 INJECTION, SOLUTION INTRAMUSCULAR; INTRAVENOUS at 15:30

## 2022-02-24 RX ADMIN — Medication 1 MILLI-UNITS/MIN: at 13:06

## 2022-02-24 RX ADMIN — DIPHENHYDRAMINE HYDROCHLORIDE 25 MG: 50 INJECTION, SOLUTION INTRAMUSCULAR; INTRAVENOUS at 16:12

## 2022-02-24 ASSESSMENT — PAIN SCALES - GENERAL
PAINLEVEL_OUTOF10: 8
PAINLEVEL_OUTOF10: 6

## 2022-02-24 NOTE — FLOWSHEET NOTE
Dr. Aniket Swann updated on patient status. Blood pressures reviewed, blood sugars reviewed. Patient is ctx irregularly. Dr. Aniket Swann is on her way for foster placement.

## 2022-02-24 NOTE — FLOWSHEET NOTE
Dr. Heart Serve notified of all labs. Protein/creatinine ratio is . 77. RN will update after patient receives her fioricet.

## 2022-02-24 NOTE — FLOWSHEET NOTE
Dr. Meeks Self updated on patient status. Blood pressures wdnl, blood sugars wdnl, strip remains category 2, last sve 7-8,90%,-1. RN will continue with poc and update as needed.

## 2022-02-24 NOTE — H&P
[address] [id number]  History and Physical Update    Pt Name: Destiny Zheng  MRN: [id number]  YOB: 1995  Date of evaluation: 2022    [] I have examined the patient and reviewed the H&P/Consult and there are no changes to the patient or plans. [x] I have examined the patient and reviewed the H&P/Consult and have noted the following changes:   27yo  at 35/6wks presented with pre-eclampsia with severe features. She required multiple doses of hydralazine since admission and was induced with cytotec. FHT category 1. Following BS due to GDM-A1. Continue to work towards delivery. Magnesium for seizure prophylaxis. Ancef for unknown GBS.             Malorie Pool MD,MD  Electronically signed 2022 at 8:30 AM

## 2022-02-24 NOTE — FLOWSHEET NOTE
MD stated patient can eat if headache decreases after Fioricet given. Patient states pain 2/10, previously was 4/10 before Fioricet. Food given to patient.

## 2022-02-24 NOTE — FLOWSHEET NOTE
Notified Dr. York Bamberger of patient's trending BP values. Dr. York Bamberger ordered patient be induced via cytotec 25 mcg vaginally then pitocin titration once cervix favorable, 5 mg hydralazine, start magnesium infusion 4 g bolus and continuous 2 g/hr, US to determine fetal position, and ancef for unknown GBS.

## 2022-02-24 NOTE — FLOWSHEET NOTE
Dr. Dolores Deluca notified patient blood glucose 179. Ordered glucose checks q2hr and insulin sliding scale.

## 2022-02-24 NOTE — PLAN OF CARE
Problem: Healthcare acquired conditions:  Goal: Absence of healthcare acquired conditions  Description: Absence of healthcare acquired conditions  Outcome: Ongoing  Note: Temperature WNL. WBC count WNL. Will continue to monitor. Problem: Discharge Planning:  Goal: Discharged to appropriate level of care  Description: Discharged to appropriate level of care  Outcome: Ongoing  Note: Patient aware she is to be discharged home after L&D stay if discharged undelivered. Care plan reviewed with patient. Patient verbalizes understanding of the plan of care and contribute to goal setting.

## 2022-02-24 NOTE — PLAN OF CARE
Problem: Discharge Planning:  Goal: Discharged to appropriate level of care  Description: Discharged to appropriate level of care  Outcome: Ongoing  Note: Pt aware of 2hr recovery period in L&D and then transfer to mom/baby for the remainder of her stay. Problem: Anxiety:  Goal: Level of anxiety will decrease  Description: Level of anxiety will decrease  Outcome: Ongoing  Note: Pt remains calm about the birthing experience,  at bedside, supportive. All questions/concerns addressed by RN. Problem: Breathing Pattern - Ineffective:  Goal: Able to breathe comfortably  Description: Able to breathe comfortably  Outcome: Ongoing  Note: No signs of resp distress noted. Sp02 remains greater than 92% on room air. Respirations equal and unlabored. Problem: Fluid Volume - Imbalance:  Goal: Absence of intrapartum hemorrhage signs and symptoms  Description: Absence of intrapartum hemorrhage signs and symptoms  Outcome: Ongoing  Note: No vaginal bleeding noted, will continue to monitor. Problem: Infection - Intrapartum Infection:  Goal: Will show no infection signs and symptoms  Description: Will show no infection signs and symptoms  Outcome: Ongoing  Note: Vitals stable, pt remains afebrile. FHT's remain reassuring, will continue to monitor. Problem: Labor Process - Prolonged:  Goal: Uterine contractions within specified parameters  Description: Uterine contractions within specified parameters  Outcome: Ongoing  Note: Pt having irregular contractions. Cytotec initiated. Will continue to monitor. Problem: Tissue Perfusion - Uteroplacental, Altered:  Goal: Absence of abnormal fetal heart rate pattern  Description: Absence of abnormal fetal heart rate pattern  Outcome: Ongoing  Note: Fetal Heart Tones remain reassuring. Continuous EFM in place.         Problem: Urinary Retention:  Goal: Urinary elimination within specified parameters  Description: Urinary elimination within specified parameters  Outcome: Ongoing  Note: Lindsey catheter in place, urine output adequate. Problem: Falls - Risk of:  Goal: Will remain free from falls  Description: Will remain free from falls  Outcome: Ongoing  Note: Pt. Remains free from falls at this time. IV infusing per order. RN encouraged pt. To call for assistance to BR. Side rails up X2. Call light within reach. S.O. At bedside. RN will continue to provide for a safe environment. Care plan reviewed with patient and . Patient and  verbalize understanding of the plan of care and contribute to goal setting.

## 2022-02-24 NOTE — FLOWSHEET NOTE
Updated Dr. Augie Waldrop regarding patient admission and complaints including headache (now resolved) trending BP and glucose values, labs, total hydralazine administered, SVE, FHR category 1, and magnesium and cytotec induction initiated. Verified insulin sliding scale- follow nightly scale d/t NPO. Dr. Augie Waldrop ordered another 10 mg hydralazine be given and increase POCT glucose frequency to every hour. Will address second steroid dose this evening.  MD stated she will be up to unit to round on patient this AM.

## 2022-02-24 NOTE — ANESTHESIA PRE PROCEDURE
Department of Anesthesiology  Preprocedure Note       Name:  Martha Archer   Age:  27 y.o.  :  1995                                          MRN:  [id number]         Date:  2022      Surgeon: * No surgeons listed *    Procedure: * No procedures listed *    Medications prior to admission:   Prior to Admission medications    Medication Sig Start Date End Date Taking?  Authorizing Provider   acetaminophen (TYLENOL) 500 MG tablet Take 500 mg by mouth every 6 hours as needed for Pain   Yes Historical Provider, MD   Prenatal MV-Min-Fe Fum-FA-DHA (PRENATAL 1 PO) Take by mouth   Yes Historical Provider, MD   aspirin 81 MG EC tablet Take 81 mg by mouth daily    Historical Provider, MD       Current medications:    Current Facility-Administered Medications   Medication Dose Route Frequency Provider Last Rate Last Admin    oxytocin (PITOCIN) 30 units in 500 mL infusion  87.3 dulce-units/min IntraVENous PRN Lynnette Hurtado MD        And    oxytocin (PITOCIN) 10 unit bolus from the bag  10 Units IntraVENous PRN Lynnette Hurtado MD        ceFAZolin (ANCEF) 1,000 mg in sodium chloride 0.9 % 50 mL IVPB  1,000 mg IntraVENous Q8H Lynnette Hurtado MD   Stopped at 22 0946    ePHEDrine injection 5 mg  5 mg IntraVENous PRN Marcy Malia, DO        fentaNYL 750 mcg, ropivacaine 0.1% in sodium chloride 0.9% 265mL (OB) epidural  18 mL/hr Epidural Continuous Marcy Malia, DO        lactated ringers infusion   IntraVENous Continuous Lynnette Hurtado MD        butalbital-acetaminophen-caffeine [facility]) per tablet 1 tablet  1 tablet Oral Q4H PRN Lynnette Hurtado MD   1 tablet at 22 1921    lactated ringers infusion   IntraVENous Continuous Lynnette Hurtado MD 75 mL/hr at 22 1210 Rate Verify at 22 1210    ondansetron (ZOFRAN) injection 8 mg  8 mg IntraVENous Q8H PRN Lynnette Hurtado MD   8 mg at 22 0910    famotidine (PEPCID) injection 20 mg  20 mg IntraVENous BID PRN Lynnette Hurtado MD        magnesium sulfate (71379 mg/500mL infusion)  2,000 mg/hr IntraVENous Continuous Cristo Long MD 50 mL/hr at 02/24/22 1210 2,000 mg/hr at 02/24/22 1210    calcium gluconate 10 % injection 1,000 mg  1,000 mg IntraVENous PRN Cristo Long MD        oxytocin (PITOCIN) 30 units in 500 mL infusion  1 dulce-units/min IntraVENous Continuous Cristo Long MD        terbutaline (BRETHINE) injection 0.25 mg  0.25 mg SubCUTAneous Once Cristo Long MD        lactated ringers bolus  500 mL IntraVENous PRN Cristo Long MD        Or   [facility] lactated ringers bolus  1,000 mL IntraVENous PRN Cristo Long MD        lidocaine PF 1 % injection 30 mL  30 mL Other PRN Cristo Long MD        butorphanol (STADOL) injection 1 mg  1 mg IntraVENous Q2H PRN Cristo Long MD   1 mg at 02/24/22 4712    nitrous oxide 50% inhalation 1 each  1 each Inhalation Continuous PRN Cristo Long MD        diphenhydrAMINE (BENADRYL) injection 25 mg  25 mg IntraVENous Q4H PRN Cristo Long MD        carboprost (HEMABATE) injection 250 mcg  250 mcg IntraMUSCular PRN Cristo Long MD        miSOPROStol (CYTOTEC) tablet 1,000 mcg  1,000 mcg Rectal PRN Cristo Long MD        acetaminophen (TYLENOL) tablet 650 mg  650 mg Oral Q4H PRN Cristo Long MD        ibuprofen (ADVIL;MOTRIN) tablet 800 mg  800 mg Oral Q8H PRN Cristo Ethan, MD        witch hazel-glycerin (TUCKS) pad   Topical PRN Cristo Long MD        benzocaine-menthol (DERMOPLAST) 20-0.5 % spray   Topical PRN Cristo Long MD        docusate sodium (COLACE) capsule 100 mg  100 mg Oral BID PRN Cristo Long MD        insulin lispro (HUMALOG) injection vial 0-12 Units  0-12 Units SubCUTAneous TID WC Cristo Long MD   2 Units at 02/23/22 4367    insulin lispro (HUMALOG) injection vial 0-6 Units  0-6 Units SubCUTAneous Nightly Cristo Long MD   1 Units at 02/24/22 5758       Allergies:     Allergies   Allergen Reactions  Pcn [Penicillins]      AS A CHILD       Problem List:    Patient Active Problem List   Diagnosis Code    Decreased fetal movement in pregnancy, third trimester, fetus 1 N87.9537    Pregnancy complication, antepartum O26.90    High blood pressure affecting pregnancy in third trimester, antepartum O16.3    Preeclampsia, third trimester O14.93       Past Medical History:        Diagnosis Date    Diabetes mellitus ([address]     Gestational diabetes    Heart abnormality     Heart murmur    Infertility, female     history multiple miscarriages    Mental disorder     anxiety       Past Surgical History:        Procedure Laterality Date    DILATION AND CURETTAGE OF UTERUS         Social History:    Social History     Tobacco Use    Smoking status: Never Smoker    Smokeless tobacco: Never Used   Substance Use Topics    Alcohol use: Not Currently                                Counseling given: Not Answered      Vital Signs (Current):   Vitals:    02/24/22 1141 02/24/22 1142 02/24/22 1147 02/24/22 1200   BP:  (!) 116/56 (!) 115/55 113/60   Pulse:  90 89 82   Resp:       Temp:       TempSrc:       SpO2: 99% 99% 100% 100%   Weight:       Height:                                                  BP Readings from Last 3 Encounters:   02/24/22 113/60   02/11/22 (!) 140/87   01/07/22 111/74       NPO Status:                                                                                 BMI:   Wt Readings from Last 3 Encounters:   02/23/22 152 lb (68.9 kg)   02/11/22 146 lb (66.2 kg)   06/08/20 127 lb (57.6 kg)     Body mass index is 33.48 kg/m².     CBC:   Lab Results   Component Value Date    WBC 8.9 02/23/2022    RBC 3.66 02/23/2022    HGB 10.3 02/23/2022    HCT 30.8 02/23/2022    MCV 84.2 02/23/2022     02/23/2022       CMP:   Lab Results   Component Value Date     02/23/2022    K 3.3 02/23/2022     02/23/2022    CO2 24 02/23/2022    BUN 4 02/23/2022    CREATININE 0.4 02/23/2022    LABGLOM >90 02/23/2022    GLUCOSE 105 02/23/2022    PROT 5.7 02/23/2022    CALCIUM 8.8 02/23/2022    BILITOT 0.2 02/23/2022    ALKPHOS 127 02/23/2022    AST 19 02/23/2022    ALT 22 02/23/2022       POC Tests:   Recent Labs     02/24/22  1155   POCGLU 134*       Coags:   Lab Results   Component Value Date    PROTIME 12.1 05/27/2021       HCG (If Applicable): No results found for: PREGTESTUR, PREGSERUM, HCG, HCGQUANT     ABGs: No results found for: PHART, PO2ART, NBA3TKV, SBB1JQF, BEART, R4RPCPVA     Type & Screen (If Applicable):  Lab Results   Component Value Date    LABRH POS 02/23/2022       Drug/Infectious Status (If Applicable):  Lab Results   Component Value Date    HEPCAB Negative 08/16/2021       COVID-19 Screening (If Applicable): No results found for: COVID19        Anesthesia Evaluation  Patient summary reviewed  Airway: Mallampati: III  TM distance: >3 FB   Neck ROM: full  Mouth opening: > = 3 FB Dental:          Pulmonary:                              Cardiovascular:                      Neuro/Psych:   (+) psychiatric history:            GI/Hepatic/Renal:             Endo/Other:    (+) Diabetes, . Abdominal:             Vascular: Other Findings:             Anesthesia Plan      epidural     ASA 2             Anesthetic plan and risks discussed with patient. Plan discussed with MONTSE. Celsa Zheng.  [address] [facility]   2/24/2022

## 2022-02-24 NOTE — FLOWSHEET NOTE
Clarified insulin order with Dr. Martha Esets. Patient to receive nightly insulin sliding scale dose q2hr as needed d/t NPO.

## 2022-02-24 NOTE — PLAN OF CARE
Problem: Anxiety:  Goal: Level of anxiety will decrease  Description: Level of anxiety will decrease  2/24/2022 1314 by Pato Ortiz RN  Outcome: Ongoing  Note: Pt remains calm about the birthing experience,  at bedside, supportive. All questions/concerns addressed by RN.   2/24/2022 0735 by Jude Shannon RN  Outcome: Ongoing  Note: Pt remains calm about the birthing experience,  at bedside, supportive. All questions/concerns addressed by RN. Problem: Breathing Pattern - Ineffective:  Goal: Able to breathe comfortably  Description: Able to breathe comfortably  2/24/2022 1314 by Pato Ortiz RN  Outcome: Ongoing  Note: Patient breathing well through ctx.   2/24/2022 0735 by Jude Shannon RN  Outcome: Ongoing  Note: No signs of resp distress noted. Sp02 remains greater than 92% on room air. Respirations equal and unlabored. Problem: Fluid Volume - Imbalance:  Goal: Absence of intrapartum hemorrhage signs and symptoms  Description: Absence of intrapartum hemorrhage signs and symptoms  2/24/2022 1314 by Pato Ortiz RN  Outcome: Ongoing  Note: No vaginal bleeding noted, will continue to monitor. 2/24/2022 0735 by Jude Shannon RN  Outcome: Ongoing  Note: No vaginal bleeding noted, will continue to monitor. Problem: Infection - Intrapartum Infection:  Goal: Will show no infection signs and symptoms  Description: Will show no infection signs and symptoms  2/24/2022 1314 by Pato Ortiz RN  Outcome: Ongoing  Note: Vitals stable, pt remains afebrile. FHT's remain reassuring, will continue to monitor. 2/24/2022 0735 by Jude Shannon RN  Outcome: Ongoing  Note: Vitals stable, pt remains afebrile. FHT's remain reassuring, will continue to monitor.        Problem: Labor Process - Prolonged:  Goal: Uterine contractions within specified parameters  Description: Uterine contractions within specified parameters  2/24/2022 1314 by Pato Ortiz RN  Outcome: Ongoing  Note: Patient ctx irregularly. 2/24/2022 0735 by Gerardo Medina RN  Outcome: Ongoing  Note: Pt having irregular contractions. Cytotec initiated. Will continue to monitor. Problem: Tissue Perfusion - Uteroplacental, Altered:  Goal: Absence of abnormal fetal heart rate pattern  Description: Absence of abnormal fetal heart rate pattern  2/24/2022 1314 by Imani Chan RN  Outcome: Ongoing  Note: Fetal heart tones remain reactive. 2/24/2022 0735 by Gerardo Medina RN  Outcome: Ongoing  Note: Fetal Heart Tones remain reassuring. Continuous EFM in place. Problem: Urinary Retention:  Goal: Urinary elimination within specified parameters  Description: Urinary elimination within specified parameters  2/24/2022 1314 by Imani Chan RN  Outcome: Ongoing  Note: Lindsey catheter draining clear, yellow urine. 2/24/2022 0735 by Gerardo Medina RN  Outcome: Ongoing  Note: Lindsey catheter in place, urine output adequate. Problem: Falls - Risk of:  Goal: Will remain free from falls  Description: Will remain free from falls  2/24/2022 1314 by Imani Chan RN  Outcome: Ongoing  Note: Pt. Remains free from falls at this time. IV infusing per order. RN encouraged pt. To call for assistance to BR. Side rails up X2. Call light within reach. S.O. At bedside. RN will continue to provide for a safe environment. 2/24/2022 0735 by Gerardo Medina RN  Outcome: Ongoing  Note: Pt. Remains free from falls at this time. IV infusing per order. RN encouraged pt. To call for assistance to BR. Side rails up X2. Call light within reach. S.O. At bedside. RN will continue to provide for a safe environment. Problem: Discharge Planning:  Goal: Discharged to appropriate level of care  Description: Discharged to appropriate level of care  2/24/2022 1314 by Imani Chan RN  Outcome: Ongoing  Note: Pt aware of 2hr recovery period in L&D and then transfer to mom/baby for the remainder of her stay.    2/24/2022 0735 by Gerardo Medina RN  Outcome: Ongoing  Note: Pt aware of 2hr recovery period in L&D and then transfer to mom/baby for the remainder of her stay. Care plan reviewed with patient and . Patient and  verbalize understanding of the plan of care and contribute to goal setting.

## 2022-02-25 VITALS — SYSTOLIC BLOOD PRESSURE: 88 MMHG | OXYGEN SATURATION: 89 % | DIASTOLIC BLOOD PRESSURE: 51 MMHG

## 2022-02-25 LAB
GLUCOSE BLD-MCNC: 133 MG/DL (ref 70–108)
GLUCOSE BLD-MCNC: 157 MG/DL (ref 70–108)
GLUCOSE BLD-MCNC: 93 MG/DL (ref 70–108)
GROUP B STREP CULTURE: NORMAL

## 2022-02-25 PROCEDURE — 6360000002 HC RX W HCPCS: Performed by: STUDENT IN AN ORGANIZED HEALTH CARE EDUCATION/TRAINING PROGRAM

## 2022-02-25 PROCEDURE — 6370000000 HC RX 637 (ALT 250 FOR IP)

## 2022-02-25 PROCEDURE — 7100000001 HC PACU RECOVERY - ADDTL 15 MIN: Performed by: OBSTETRICS & GYNECOLOGY

## 2022-02-25 PROCEDURE — 2580000003 HC RX 258: Performed by: OBSTETRICS & GYNECOLOGY

## 2022-02-25 PROCEDURE — 1220000001 HC SEMI PRIVATE L&D R&B

## 2022-02-25 PROCEDURE — 6360000002 HC RX W HCPCS: Performed by: ANESTHESIOLOGY

## 2022-02-25 PROCEDURE — 7100000000 HC PACU RECOVERY - FIRST 15 MIN: Performed by: OBSTETRICS & GYNECOLOGY

## 2022-02-25 PROCEDURE — 6360000002 HC RX W HCPCS: Performed by: OBSTETRICS & GYNECOLOGY

## 2022-02-25 PROCEDURE — 2500000003 HC RX 250 WO HCPCS

## 2022-02-25 PROCEDURE — 2500000003 HC RX 250 WO HCPCS: Performed by: STUDENT IN AN ORGANIZED HEALTH CARE EDUCATION/TRAINING PROGRAM

## 2022-02-25 PROCEDURE — 3700000000 HC ANESTHESIA ATTENDED CARE: Performed by: OBSTETRICS & GYNECOLOGY

## 2022-02-25 PROCEDURE — 6360000002 HC RX W HCPCS: Performed by: NURSE ANESTHETIST, CERTIFIED REGISTERED

## 2022-02-25 PROCEDURE — 3700000001 HC ADD 15 MINUTES (ANESTHESIA): Performed by: OBSTETRICS & GYNECOLOGY

## 2022-02-25 PROCEDURE — 82948 REAGENT STRIP/BLOOD GLUCOSE: CPT

## 2022-02-25 PROCEDURE — 2500000003 HC RX 250 WO HCPCS: Performed by: ANESTHESIOLOGY

## 2022-02-25 PROCEDURE — 6360000002 HC RX W HCPCS

## 2022-02-25 PROCEDURE — 6370000000 HC RX 637 (ALT 250 FOR IP): Performed by: OBSTETRICS & GYNECOLOGY

## 2022-02-25 PROCEDURE — 3609079900 HC CESAREAN SECTION: Performed by: OBSTETRICS & GYNECOLOGY

## 2022-02-25 PROCEDURE — 2709999900 HC NON-CHARGEABLE SUPPLY: Performed by: OBSTETRICS & GYNECOLOGY

## 2022-02-25 RX ORDER — KETOROLAC TROMETHAMINE 30 MG/ML
15 INJECTION, SOLUTION INTRAMUSCULAR; INTRAVENOUS EVERY 6 HOURS
Status: COMPLETED | OUTPATIENT
Start: 2022-02-25 | End: 2022-02-25

## 2022-02-25 RX ORDER — SODIUM CHLORIDE, SODIUM LACTATE, POTASSIUM CHLORIDE, CALCIUM CHLORIDE 600; 310; 30; 20 MG/100ML; MG/100ML; MG/100ML; MG/100ML
INJECTION, SOLUTION INTRAVENOUS CONTINUOUS
Status: DISCONTINUED | OUTPATIENT
Start: 2022-02-25 | End: 2022-02-28 | Stop reason: HOSPADM

## 2022-02-25 RX ORDER — ONDANSETRON 2 MG/ML
4 INJECTION INTRAMUSCULAR; INTRAVENOUS EVERY 6 HOURS PRN
Status: ACTIVE | OUTPATIENT
Start: 2022-02-25 | End: 2022-02-26

## 2022-02-25 RX ORDER — MORPHINE SULFATE 4 MG/ML
4 INJECTION, SOLUTION INTRAMUSCULAR; INTRAVENOUS
Status: DISCONTINUED | OUTPATIENT
Start: 2022-02-25 | End: 2022-02-28 | Stop reason: HOSPADM

## 2022-02-25 RX ORDER — NALOXONE HYDROCHLORIDE 0.4 MG/ML
0.4 INJECTION, SOLUTION INTRAMUSCULAR; INTRAVENOUS; SUBCUTANEOUS PRN
Status: ACTIVE | OUTPATIENT
Start: 2022-02-25 | End: 2022-02-26

## 2022-02-25 RX ORDER — KETOROLAC TROMETHAMINE 30 MG/ML
30 INJECTION, SOLUTION INTRAMUSCULAR; INTRAVENOUS ONCE
Status: COMPLETED | OUTPATIENT
Start: 2022-02-25 | End: 2022-02-25

## 2022-02-25 RX ORDER — IBUPROFEN 800 MG/1
800 TABLET ORAL EVERY 8 HOURS
Status: DISCONTINUED | OUTPATIENT
Start: 2022-02-26 | End: 2022-02-28 | Stop reason: HOSPADM

## 2022-02-25 RX ORDER — ACETAMINOPHEN 325 MG/1
325 TABLET ORAL EVERY 4 HOURS PRN
Status: ACTIVE | OUTPATIENT
Start: 2022-02-25 | End: 2022-02-26

## 2022-02-25 RX ORDER — MORPHINE SULFATE 0.5 MG/ML
INJECTION, SOLUTION EPIDURAL; INTRATHECAL; INTRAVENOUS PRN
Status: DISCONTINUED | OUTPATIENT
Start: 2022-02-25 | End: 2022-02-25 | Stop reason: SDUPTHER

## 2022-02-25 RX ORDER — DOCUSATE SODIUM 100 MG/1
100 CAPSULE, LIQUID FILLED ORAL 2 TIMES DAILY
Status: DISCONTINUED | OUTPATIENT
Start: 2022-02-25 | End: 2022-02-28 | Stop reason: HOSPADM

## 2022-02-25 RX ORDER — MORPHINE SULFATE 2 MG/ML
2 INJECTION, SOLUTION INTRAMUSCULAR; INTRAVENOUS
Status: DISCONTINUED | OUTPATIENT
Start: 2022-02-25 | End: 2022-02-28 | Stop reason: HOSPADM

## 2022-02-25 RX ORDER — OXYCODONE HYDROCHLORIDE AND ACETAMINOPHEN 5; 325 MG/1; MG/1
2 TABLET ORAL EVERY 4 HOURS PRN
Status: DISCONTINUED | OUTPATIENT
Start: 2022-02-25 | End: 2022-02-28 | Stop reason: HOSPADM

## 2022-02-25 RX ORDER — DIPHENHYDRAMINE HYDROCHLORIDE 50 MG/ML
25 INJECTION INTRAMUSCULAR; INTRAVENOUS EVERY 6 HOURS PRN
Status: ACTIVE | OUTPATIENT
Start: 2022-02-25 | End: 2022-02-26

## 2022-02-25 RX ORDER — SODIUM CHLORIDE 0.9 % (FLUSH) 0.9 %
10 SYRINGE (ML) INJECTION EVERY 12 HOURS SCHEDULED
Status: DISCONTINUED | OUTPATIENT
Start: 2022-02-25 | End: 2022-02-28 | Stop reason: HOSPADM

## 2022-02-25 RX ORDER — BISACODYL 10 MG
10 SUPPOSITORY, RECTAL RECTAL DAILY PRN
Status: DISCONTINUED | OUTPATIENT
Start: 2022-02-25 | End: 2022-02-28 | Stop reason: HOSPADM

## 2022-02-25 RX ORDER — MODIFIED LANOLIN
OINTMENT (GRAM) TOPICAL
Status: DISCONTINUED | OUTPATIENT
Start: 2022-02-25 | End: 2022-02-28 | Stop reason: HOSPADM

## 2022-02-25 RX ORDER — HYDRALAZINE HYDROCHLORIDE 20 MG/ML
10 INJECTION INTRAMUSCULAR; INTRAVENOUS EVERY 6 HOURS PRN
Status: DISCONTINUED | OUTPATIENT
Start: 2022-02-25 | End: 2022-02-28 | Stop reason: HOSPADM

## 2022-02-25 RX ORDER — OXYCODONE HYDROCHLORIDE AND ACETAMINOPHEN 5; 325 MG/1; MG/1
1 TABLET ORAL EVERY 4 HOURS PRN
Status: DISCONTINUED | OUTPATIENT
Start: 2022-02-25 | End: 2022-02-28 | Stop reason: HOSPADM

## 2022-02-25 RX ORDER — HYDRALAZINE HYDROCHLORIDE 20 MG/ML
5 INJECTION INTRAMUSCULAR; INTRAVENOUS ONCE
Status: COMPLETED | OUTPATIENT
Start: 2022-02-25 | End: 2022-02-25

## 2022-02-25 RX ORDER — SODIUM CHLORIDE 0.9 % (FLUSH) 0.9 %
10 SYRINGE (ML) INJECTION PRN
Status: DISCONTINUED | OUTPATIENT
Start: 2022-02-25 | End: 2022-02-28 | Stop reason: HOSPADM

## 2022-02-25 RX ORDER — OXYCODONE HYDROCHLORIDE AND ACETAMINOPHEN 5; 325 MG/1; MG/1
2 TABLET ORAL EVERY 4 HOURS PRN
Status: ACTIVE | OUTPATIENT
Start: 2022-02-25 | End: 2022-02-26

## 2022-02-25 RX ORDER — OXYTOCIN 10 [USP'U]/ML
INJECTION, SOLUTION INTRAMUSCULAR; INTRAVENOUS PRN
Status: DISCONTINUED | OUTPATIENT
Start: 2022-02-25 | End: 2022-02-25 | Stop reason: SDUPTHER

## 2022-02-25 RX ORDER — ACETAMINOPHEN 325 MG/1
650 TABLET ORAL EVERY 4 HOURS PRN
Status: DISCONTINUED | OUTPATIENT
Start: 2022-02-25 | End: 2022-02-28 | Stop reason: HOSPADM

## 2022-02-25 RX ORDER — KETOROLAC TROMETHAMINE 30 MG/ML
30 INJECTION, SOLUTION INTRAMUSCULAR; INTRAVENOUS ONCE
Status: DISCONTINUED | OUTPATIENT
Start: 2022-02-25 | End: 2022-02-28 | Stop reason: HOSPADM

## 2022-02-25 RX ORDER — SODIUM CHLORIDE 9 MG/ML
25 INJECTION, SOLUTION INTRAVENOUS PRN
Status: DISCONTINUED | OUTPATIENT
Start: 2022-02-25 | End: 2022-02-28 | Stop reason: HOSPADM

## 2022-02-25 RX ORDER — EPHEDRINE SULFATE/0.9% NACL/PF 50 MG/5 ML
10 SYRINGE (ML) INTRAVENOUS ONCE
Status: COMPLETED | OUTPATIENT
Start: 2022-02-25 | End: 2022-02-25

## 2022-02-25 RX ORDER — PROMETHAZINE HYDROCHLORIDE 25 MG/ML
25 INJECTION, SOLUTION INTRAMUSCULAR; INTRAVENOUS ONCE
Status: COMPLETED | OUTPATIENT
Start: 2022-02-25 | End: 2022-02-25

## 2022-02-25 RX ORDER — OXYCODONE HYDROCHLORIDE AND ACETAMINOPHEN 5; 325 MG/1; MG/1
1 TABLET ORAL EVERY 4 HOURS PRN
Status: ACTIVE | OUTPATIENT
Start: 2022-02-25 | End: 2022-02-26

## 2022-02-25 RX ORDER — KETOROLAC TROMETHAMINE 30 MG/ML
30 INJECTION, SOLUTION INTRAMUSCULAR; INTRAVENOUS EVERY 6 HOURS
Status: ACTIVE | OUTPATIENT
Start: 2022-02-25 | End: 2022-02-26

## 2022-02-25 RX ORDER — HYDRALAZINE HYDROCHLORIDE 20 MG/ML
INJECTION INTRAMUSCULAR; INTRAVENOUS
Status: COMPLETED
Start: 2022-02-25 | End: 2022-02-25

## 2022-02-25 RX ORDER — SODIUM CHLORIDE, SODIUM LACTATE, POTASSIUM CHLORIDE, AND CALCIUM CHLORIDE .6; .31; .03; .02 G/100ML; G/100ML; G/100ML; G/100ML
1000 INJECTION, SOLUTION INTRAVENOUS ONCE
Status: DISCONTINUED | OUTPATIENT
Start: 2022-02-25 | End: 2022-02-28 | Stop reason: HOSPADM

## 2022-02-25 RX ORDER — LIDOCAINE HYDROCHLORIDE AND EPINEPHRINE 20; 5 MG/ML; UG/ML
INJECTION, SOLUTION EPIDURAL; INFILTRATION; INTRACAUDAL; PERINEURAL PRN
Status: DISCONTINUED | OUTPATIENT
Start: 2022-02-24 | End: 2022-02-25 | Stop reason: SDUPTHER

## 2022-02-25 RX ADMIN — INSULIN LISPRO 1 UNITS: 100 INJECTION, SOLUTION INTRAVENOUS; SUBCUTANEOUS at 08:36

## 2022-02-25 RX ADMIN — PHENYLEPHRINE HYDROCHLORIDE 200 MCG: 10 INJECTION INTRAVENOUS at 00:26

## 2022-02-25 RX ADMIN — ENOXAPARIN SODIUM 40 MG: 100 INJECTION SUBCUTANEOUS at 13:59

## 2022-02-25 RX ADMIN — HYDRALAZINE HYDROCHLORIDE 5 MG: 20 INJECTION INTRAMUSCULAR; INTRAVENOUS at 10:17

## 2022-02-25 RX ADMIN — PHENYLEPHRINE HYDROCHLORIDE 100 MCG: 10 INJECTION INTRAVENOUS at 00:33

## 2022-02-25 RX ADMIN — PHENYLEPHRINE HYDROCHLORIDE 200 MCG: 10 INJECTION INTRAVENOUS at 00:36

## 2022-02-25 RX ADMIN — Medication 10 MG: at 01:10

## 2022-02-25 RX ADMIN — Medication 10 MG: at 01:17

## 2022-02-25 RX ADMIN — ONDANSETRON 8 MG: 2 INJECTION INTRAMUSCULAR; INTRAVENOUS at 06:35

## 2022-02-25 RX ADMIN — AZITHROMYCIN MONOHYDRATE 500 MG: 500 INJECTION, POWDER, LYOPHILIZED, FOR SOLUTION INTRAVENOUS at 00:10

## 2022-02-25 RX ADMIN — PHENYLEPHRINE HYDROCHLORIDE 100 MCG: 10 INJECTION INTRAVENOUS at 00:41

## 2022-02-25 RX ADMIN — DOCUSATE SODIUM 100 MG: 100 CAPSULE, LIQUID FILLED ORAL at 21:02

## 2022-02-25 RX ADMIN — MAGNESIUM SULFATE IN WATER 2000 MG/HR: 40 INJECTION, SOLUTION INTRAVENOUS at 07:24

## 2022-02-25 RX ADMIN — PHENYLEPHRINE HYDROCHLORIDE 100 MCG: 10 INJECTION INTRAVENOUS at 00:08

## 2022-02-25 RX ADMIN — PHENYLEPHRINE HYDROCHLORIDE 200 MCG: 10 INJECTION INTRAVENOUS at 00:58

## 2022-02-25 RX ADMIN — PHENYLEPHRINE HYDROCHLORIDE 200 MCG: 10 INJECTION INTRAVENOUS at 00:38

## 2022-02-25 RX ADMIN — PROMETHAZINE HYDROCHLORIDE 25 MG: 25 INJECTION INTRAMUSCULAR; INTRAVENOUS at 08:35

## 2022-02-25 RX ADMIN — SODIUM CHLORIDE, POTASSIUM CHLORIDE, SODIUM LACTATE AND CALCIUM CHLORIDE: 600; 310; 30; 20 INJECTION, SOLUTION INTRAVENOUS at 21:40

## 2022-02-25 RX ADMIN — PHENYLEPHRINE HYDROCHLORIDE 300 MCG: 10 INJECTION INTRAVENOUS at 00:28

## 2022-02-25 RX ADMIN — KETOROLAC TROMETHAMINE 15 MG: 30 INJECTION, SOLUTION INTRAMUSCULAR; INTRAVENOUS at 23:08

## 2022-02-25 RX ADMIN — PHENYLEPHRINE HYDROCHLORIDE 100 MCG: 10 INJECTION INTRAVENOUS at 00:10

## 2022-02-25 RX ADMIN — KETOROLAC TROMETHAMINE 30 MG: 30 INJECTION, SOLUTION INTRAMUSCULAR; INTRAVENOUS at 02:29

## 2022-02-25 RX ADMIN — HYDRALAZINE HYDROCHLORIDE 10 MG: 20 INJECTION INTRAMUSCULAR; INTRAVENOUS at 11:16

## 2022-02-25 RX ADMIN — MAGNESIUM SULFATE IN WATER 2000 MG/HR: 40 INJECTION, SOLUTION INTRAVENOUS at 02:29

## 2022-02-25 RX ADMIN — MORPHINE SULFATE 2 MG: 0.5 INJECTION, SOLUTION EPIDURAL; INTRATHECAL; INTRAVENOUS at 00:35

## 2022-02-25 RX ADMIN — Medication 10 MG: at 01:00

## 2022-02-25 RX ADMIN — PHENYLEPHRINE HYDROCHLORIDE 200 MCG: 10 INJECTION INTRAVENOUS at 00:15

## 2022-02-25 RX ADMIN — OXYTOCIN 20 ML: 10 INJECTION, SOLUTION INTRAMUSCULAR; INTRAVENOUS at 00:21

## 2022-02-25 RX ADMIN — SODIUM CHLORIDE, POTASSIUM CHLORIDE, SODIUM LACTATE AND CALCIUM CHLORIDE: 600; 310; 30; 20 INJECTION, SOLUTION INTRAVENOUS at 07:26

## 2022-02-25 RX ADMIN — KETOROLAC TROMETHAMINE 30 MG: 30 INJECTION, SOLUTION INTRAMUSCULAR; INTRAVENOUS at 02:32

## 2022-02-25 RX ADMIN — MAGNESIUM SULFATE IN WATER 2000 MG/HR: 40 INJECTION, SOLUTION INTRAVENOUS at 18:02

## 2022-02-25 RX ADMIN — SODIUM CHLORIDE, POTASSIUM CHLORIDE, SODIUM LACTATE AND CALCIUM CHLORIDE: 600; 310; 30; 20 INJECTION, SOLUTION INTRAVENOUS at 00:03

## 2022-02-25 RX ADMIN — KETOROLAC TROMETHAMINE 15 MG: 30 INJECTION, SOLUTION INTRAMUSCULAR; INTRAVENOUS at 09:59

## 2022-02-25 RX ADMIN — KETOROLAC TROMETHAMINE 15 MG: 30 INJECTION, SOLUTION INTRAMUSCULAR; INTRAVENOUS at 16:03

## 2022-02-25 RX ADMIN — PHENYLEPHRINE HYDROCHLORIDE 100 MCG: 10 INJECTION INTRAVENOUS at 00:13

## 2022-02-25 ASSESSMENT — PULMONARY FUNCTION TESTS
PIF_VALUE: 0
PIF_VALUE: 2
PIF_VALUE: 0

## 2022-02-25 ASSESSMENT — PAIN SCALES - GENERAL
PAINLEVEL_OUTOF10: 0
PAINLEVEL_OUTOF10: 1
PAINLEVEL_OUTOF10: 0

## 2022-02-25 NOTE — OP NOTE
Department of Obstetrics and Gynecology   Section Note    Pt Name: Mara Duffy  MRN: [id number] [place] #: [de-identified]  YOB: 1995  Procedure Performed By: Antonio Dwyer MD, MD    Indications: failure to progress - arrest of dilation, Pre-eclampsia with severe features    Pre-operative Diagnosis: 36 week pregnancy. Post-operative Diagnosis:  Same, Delivered, Living newbornMale  Procedure:  primary low transverse  section  Findings:  Normal tubes, ovaries and uterus. Baby Male    Estimated Blood Loss:  600ml         Specimens: None     Complications:  None         Condition: infant stable to general nursery and mother stable    Indications:     Lucia Roth is a 27 y.o. female  at 44w0d who presented for   section for  failure to progress - arrest of dilation. She understood the  risks and benefits and signed informed consent. Procedure: The patient was taken to the Operating Room where spinal anesthesia was adequate. She was placed in the dorsal supine position with a leftward tilt and prepped and draped. A Pfannenstiel incision was made with the scalpel taken down to the fascia. The fascia was incised in the midline. This incision extended laterally. The underlying rectus muscle dissected bluntly and with the Swenson scissors. The peritoneum identified and entered sharply. This incision extended superiorly and inferior with good visualization of the bladder. The vesicouterine peritoneum was identified and entered sharply. This incision extended laterally and the bladder flap created digitally. The lower uterine segment was incised in the midline and extended laterally. The vertex was removed from the uterus with the aid of  fundal pressure and delivered atraumatically. The rest of the baby delivered. The cord was clamped and cut and the baby was stimulated. Cord blood was obtained, the placenta delivered manually and intact.  The uterus was cleared of all clots and debris and repaired with an 0 vicryl in a running locked fashion. A second imbricating layer was used for uterine strength. The abdomen was then copiously irrigated, and hemostasis was assured with Bovie cautery. The peritoneum was closed with a 3-0 PDS, the fascia was inspected and found to be hemostatic and closed with a #1 vicryl. The subcutaneous tissue was irrigated, made hemostatic with Bovie cautery and reapproximated with 3-0 vicryl pops. The skin was closed with 4 0 vicryl suture (s). Sponge, lap and needle counts were correct x 2. The patient tolerated the procedure well and was taken to recovery room in stable condition  1000mcg of cytotec was placed rectally at the end of the procedure due to uterine atony, bleeding was controlled.       Shanel Wells M.D.  2/25/2022 12:53 AM

## 2022-02-25 NOTE — ANESTHESIA POSTPROCEDURE EVALUATION
Department of Anesthesiology  Postprocedure Note    Patient: Mumtaz Marie  MRN: [id number]  YOB: 1995  Date of evaluation: 2022  Time:  7:58 AM     Procedure Summary     Date: 22 Room / Location: [address] [place] [address]    Anesthesia Start: 1118 Anesthesia Stop: 22 0108    Procedures:        SECTION (N/A Uterus)      Labor Analgesia Diagnosis: (Failure to Progress)    Surgeons: Jovanni Cade MD Responsible Provider: Constance Ramírez DO    Anesthesia Type: epidural ASA Status: 2          Anesthesia Type: epidural    Kieran Phase I: Kieran Score: 9    Kieran Phase II:      Last vitals: Reviewed and per EMR flowsheets.        Anesthesia Post Evaluation    Patient location during evaluation: floor  Patient participation: complete - patient participated  Level of consciousness: awake and alert  Airway patency: patent  Nausea & Vomiting: nausea and vomiting  Complications: no  Cardiovascular status: hemodynamically stable  Respiratory status: acceptable, room air and spontaneous ventilation  Hydration status: stable

## 2022-02-25 NOTE — FLOWSHEET NOTE
Dr. Aaliyah Guzman called and updated on patient status. Updated on most recent SVE, FHTs reassuring, ctx every 2-4 min, pitocin @ 10 dulce-unit/hr. Orders received for a . Informed MD that OR is in a neuro case currently, anesthesia stated they will let us know as soon as they are finished.

## 2022-02-25 NOTE — PROGRESS NOTES
Progress note    Subjective:     Postoperative Day 0:  Delivery    Pain is well controlled with current medications. The patient is on magnesium for seizure prophylaxis and not yet ambulating. Lindsey in place with good urine output. BPs still elevated, mostly mild range since delivery. Pt denies HA, vision changes, CP, SOB. Has been having nausea and vomiting since delivery. Objective:     Vitals:    22 0800   BP: (!) 164/87   Pulse: 78   Resp: 18   Temp: 96.6 °F (35.9 °C)   SpO2: 99%         In: 125 [I.V.:125]  Out: 45 [Urine:45]        General:    alert, appears stated age and cooperative   Uterine Fundus:   firm   Incision:  Heart:  Lungs:  Neuro:  Extremities:  covered, no significant drainage  RRR  Clear throughout  +2 patellar reflexes bilaterally  +1 pitting pedal edema, SCDs on         CBC   Lab Results   Component Value Date    HGB 10.3 (L) 2022        Assessment:     Status post  section.    Pre-Eclampsia  Gestational diabetes: Diet controlled    Plan:   S/p   - Continue routine post-op care  - DVT ppx with SCDs and Lovenox to start at 1300 today    Pre-Eclampsia  - Continue magnesium until midnight tonight  - Strict I/Os  - Continue to monitor BPs    Gestational diabetes  - Continue fasting and postprandial blood glucose checks   - Cover with sliding scale as needed      Maryann Barclay DO 2022 8:29 AM

## 2022-02-25 NOTE — PLAN OF CARE
Problem: Anxiety:  Goal: Level of anxiety will decrease  Description: Level of anxiety will decrease  Outcome: Ongoing  Note: Pt denies anxiety. Problem: Aspiration - Risk of:  Goal: Absence of aspiration  Description: Absence of aspiration  Outcome: Ongoing  Note: NO signs/symptoms of aspiration. Problem: Venous Thromboembolism - Risk of:  Goal: Will show no signs or symptoms of venous thromboembolism  Description: Will show no signs or symptoms of venous thromboembolism  Outcome: Ongoing  Note: No dvt noted. Problem: Pain:  Goal: Pain level will decrease  Description: Pain level will decrease  Outcome: Ongoing  Note: Pt denies pain at this time, pt complains of soreness with vomiting. Problem: Discharge Planning:  Goal: Discharged to appropriate level of care  Description: Discharged to appropriate level of care  Outcome: Ongoing  Note: Plan to transfer to mom/baby following discontinuation of magnesium. ,    Care plan reviewed with patient and jonathan. Patient and jonathan verbalize understanding of the plan of care and contribute to goal setting.

## 2022-02-25 NOTE — FLOWSHEET NOTE
Pt assisted to edge of bed, pt states it feels so good to sit up. Denies any pain or nausea. Abdominal binder remain on. Jaimie-care given. Clean pads on.

## 2022-02-25 NOTE — FLOWSHEET NOTE
Dr Eloina Almanzar here on unit, covering for Dr Selena Weathers. Informed of pts elevated BPs, but pt is vomiting. Waiting for phenergan from pharmacy. Informed of BLood sugar 157 and urine output 45 this hour.  Continue plan of care

## 2022-02-25 NOTE — LACTATION NOTE
Infant remains in SCN. To L/D to assist with latch. Breastfeeding booklet provided. Demonstrated different breastfeeding positions. Demonstrated ways to wake a sleepy infant, STS, and burping prior to feeds. Infant not waking to latch at this time. Father of infant bottle feeding formula at this time Hospital pump set up . Pump teaching provided. Discussed proper labeling of EBM. Discussed appropriate amount of colostrum to expect at this time. Pt states no other questions at this time. Will follow up PRN.

## 2022-02-25 NOTE — FLOWSHEET NOTE
Lina WILLARD to room and patient continues to vomit. Order received for Phenergan IM from Dr Ev Carvalho.

## 2022-02-25 NOTE — FLOWSHEET NOTE
Dr. [place] updated on patient condition. Reviewed BP trends, medications given for BP during the recovery phase per CRNA and anesthesiology. Updated on QBL at end of recovery. Continue with current POC.

## 2022-02-25 NOTE — PROGRESS NOTES
Pt has not made much progress since 4PM. After nursing change CVX exam went from 8cm to 6cm. After 2 more hours of adequate CTX she had made no progress and recommendation was for C/S for delivery due to arrest of dilation. Pt agreeable. All questions answered. Discussion with the patient and/ or family for proposed care, treatment, services; benefits, risks, side effects; likelihood of achieving goals and potential problems that may occur during recuperation was had and all questions were answered. Discussion with the patient and/ or family of reasonable alternatives to the proposed care, treatment, services and the discussion of the risks, benefits, side effects related to the alternatives and the risk related to not receiving the proposed care treatment services was also had and all questions were answered.

## 2022-02-25 NOTE — FLOWSHEET NOTE
Telma WILLARD and Dr. Sunitha Chavis at bedside evaluating BPs, orders received for 1 L fluid bolus.

## 2022-02-26 LAB
ERYTHROCYTE [DISTWIDTH] IN BLOOD BY AUTOMATED COUNT: 14.2 % (ref 11.5–14.5)
ERYTHROCYTE [DISTWIDTH] IN BLOOD BY AUTOMATED COUNT: 44.6 FL (ref 35–45)
GLUCOSE BLD-MCNC: 86 MG/DL (ref 70–108)
HCT VFR BLD CALC: 33.2 % (ref 37–47)
HEMOGLOBIN: 10.7 GM/DL (ref 12–16)
MCH RBC QN AUTO: 27.9 PG (ref 26–33)
MCHC RBC AUTO-ENTMCNC: 32.2 GM/DL (ref 32.2–35.5)
MCV RBC AUTO: 86.7 FL (ref 81–99)
PLATELET # BLD: 320 THOU/MM3 (ref 130–400)
PMV BLD AUTO: 9.1 FL (ref 9.4–12.4)
RBC # BLD: 3.83 MILL/MM3 (ref 4.2–5.4)
WBC # BLD: 17.8 THOU/MM3 (ref 4.8–10.8)

## 2022-02-26 PROCEDURE — 6360000002 HC RX W HCPCS: Performed by: OBSTETRICS & GYNECOLOGY

## 2022-02-26 PROCEDURE — 6370000000 HC RX 637 (ALT 250 FOR IP): Performed by: STUDENT IN AN ORGANIZED HEALTH CARE EDUCATION/TRAINING PROGRAM

## 2022-02-26 PROCEDURE — 85027 COMPLETE CBC AUTOMATED: CPT

## 2022-02-26 PROCEDURE — 36415 COLL VENOUS BLD VENIPUNCTURE: CPT

## 2022-02-26 PROCEDURE — 82948 REAGENT STRIP/BLOOD GLUCOSE: CPT

## 2022-02-26 PROCEDURE — 1200000000 HC SEMI PRIVATE

## 2022-02-26 PROCEDURE — 2580000003 HC RX 258: Performed by: OBSTETRICS & GYNECOLOGY

## 2022-02-26 PROCEDURE — 6370000000 HC RX 637 (ALT 250 FOR IP): Performed by: OBSTETRICS & GYNECOLOGY

## 2022-02-26 PROCEDURE — 6360000002 HC RX W HCPCS: Performed by: STUDENT IN AN ORGANIZED HEALTH CARE EDUCATION/TRAINING PROGRAM

## 2022-02-26 RX ORDER — NIFEDIPINE 30 MG/1
30 TABLET, EXTENDED RELEASE ORAL DAILY
Status: DISCONTINUED | OUTPATIENT
Start: 2022-02-26 | End: 2022-02-28 | Stop reason: HOSPADM

## 2022-02-26 RX ORDER — HYDRALAZINE HYDROCHLORIDE 20 MG/ML
5 INJECTION INTRAMUSCULAR; INTRAVENOUS ONCE
Status: COMPLETED | OUTPATIENT
Start: 2022-02-26 | End: 2022-02-26

## 2022-02-26 RX ADMIN — IBUPROFEN 800 MG: 800 TABLET, FILM COATED ORAL at 05:23

## 2022-02-26 RX ADMIN — OXYCODONE AND ACETAMINOPHEN 2 TABLET: 5; 325 TABLET ORAL at 15:46

## 2022-02-26 RX ADMIN — OXYCODONE HYDROCHLORIDE AND ACETAMINOPHEN 1 TABLET: 5; 325 TABLET ORAL at 08:02

## 2022-02-26 RX ADMIN — OXYCODONE AND ACETAMINOPHEN 2 TABLET: 5; 325 TABLET ORAL at 11:52

## 2022-02-26 RX ADMIN — OXYCODONE AND ACETAMINOPHEN 2 TABLET: 5; 325 TABLET ORAL at 20:08

## 2022-02-26 RX ADMIN — NIFEDIPINE 30 MG: 30 TABLET, FILM COATED, EXTENDED RELEASE ORAL at 09:58

## 2022-02-26 RX ADMIN — ENOXAPARIN SODIUM 40 MG: 100 INJECTION SUBCUTANEOUS at 14:20

## 2022-02-26 RX ADMIN — SODIUM CHLORIDE, PRESERVATIVE FREE 10 ML: 5 INJECTION INTRAVENOUS at 20:09

## 2022-02-26 RX ADMIN — DOCUSATE SODIUM 100 MG: 100 CAPSULE, LIQUID FILLED ORAL at 08:02

## 2022-02-26 RX ADMIN — DOCUSATE SODIUM 100 MG: 100 CAPSULE, LIQUID FILLED ORAL at 20:08

## 2022-02-26 RX ADMIN — IBUPROFEN 800 MG: 800 TABLET, FILM COATED ORAL at 14:20

## 2022-02-26 RX ADMIN — HYDRALAZINE HYDROCHLORIDE 5 MG: 20 INJECTION INTRAMUSCULAR; INTRAVENOUS at 12:28

## 2022-02-26 ASSESSMENT — PAIN SCALES - GENERAL
PAINLEVEL_OUTOF10: 7
PAINLEVEL_OUTOF10: 3
PAINLEVEL_OUTOF10: 1
PAINLEVEL_OUTOF10: 7
PAINLEVEL_OUTOF10: 4
PAINLEVEL_OUTOF10: 7

## 2022-02-26 NOTE — PLAN OF CARE
Problem: Anxiety:  Goal: Level of anxiety will decrease  Description: Level of anxiety will decrease  2/25/2022 2010 by Claudetta Balk, RN  Outcome: Ongoing  Note: Pt denies any anxiety at this time and does not seem to be anxious. Will monitor for any anxiety     Problem: Aspiration - Risk of:  Goal: Absence of aspiration  Description: Absence of aspiration  2/25/2022 2010 by Claudetta Balk, RN  Outcome: Ongoing  Note: Lung sounds clear at this time, pt spo2 @ 98%. Pt denies any SOB. Will continue to monitor lung sounds and O2 level. Problem: Venous Thromboembolism - Risk of:  Goal: Will show no signs or symptoms of venous thromboembolism  Description: Will show no signs or symptoms of venous thromboembolism  2/25/2022 2010 by Claudetta Balk, RN  Outcome: Ongoing  Note: Pt denies any pain in legs, SCD's in place and on. Will monitor for any s/s of DVT. Problem: Pain:  Goal: Pain level will decrease  Description: Pain level will decrease  2/25/2022 2010 by Claudetta Balk, RN  Outcome: Ongoing  Note: Pt denies any pain at this time. Will continue to monitor pain level. Problem: Discharge Planning:  Goal: Discharged to appropriate level of care  Description: Discharged to appropriate level of care  2/25/2022 2010 by Claudetta Balk, RN  Outcome: Ongoing  Note: Pt plans to go to mother/baby in the after Mag infusion complete or in the morning. Care plan reviewed with patient. Patient verbalizes understanding of the care plan and contributed to goal setting.

## 2022-02-26 NOTE — PLAN OF CARE
Problem: Venous Thromboembolism - Risk of:  Goal: Will show no signs or symptoms of venous thromboembolism  Description: Will show no signs or symptoms of venous thromboembolism  2/26/2022 1026 by Lucia Ham RN  Outcome: Ongoing  Note: Negative homans. Problem: Pain:  Goal: Pain level will decrease  Description: Pain level will decrease  2/26/2022 1026 by Lucia Ham RN  Outcome: Ongoing  Note: Scheduled motrin and PRN percocet given with relief. Pain goal met this shift. Problem: Discharge Planning:  Goal: Discharged to appropriate level of care  Description: Discharged to appropriate level of care  2/26/2022 1026 by Lucia Ham RN  Outcome: Ongoing  Note: Plan is to be discharged home with significant other. Problem: Discharge Planning:  Goal: Discharged to appropriate level of care  Description: Discharged to appropriate level of care  2/26/2022 1026 by Lucia Ham RN  Outcome: Ongoing     Problem: Fluid Volume - Imbalance:  Goal: Absence of postpartum hemorrhage signs and symptoms  Description: Absence of postpartum hemorrhage signs and symptoms  2/26/2022 1026 by Lucia Ham RN  Outcome: Ongoing  Note: Vaginal bleeding WNL, Fundus firm and midline, no clots or foul odors. Problem: Infection - Surgical Site:  Goal: Will show no infection signs and symptoms  Description: Will show no infection signs and symptoms  2/26/2022 1026 by Lucia Ham RN  Outcome: Ongoing  Note: Afebrile this shift. Problem: Mood - Altered:  Goal: Mood stable  Description: Mood stable  2/26/2022 1026 by Lucia Ham RN  Outcome: Ongoing  Note: Emotional support provided. Problem: Pain - Acute:  Goal: Pain level will decrease  Description: Pain level will decrease  2/26/2022 1026 by Lucia Ham RN  Outcome: Ongoing  Note: Scheduled motrin and PRN percocet given with relief. Pain goal met this shift.       Problem: Venous Thromboembolism:  Goal: Will show no signs or symptoms of venous thromboembolism  Description: Will show no signs or symptoms of venous thromboembolism  2/26/2022 1026 by April Hipolito Ang RN  Outcome: Ongoing  Note: Negative homans. Care plan reviewed with patient and she contributes to goal setting and voices understanding of plan of care.

## 2022-02-26 NOTE — FLOWSHEET NOTE
Dr Perkins Sales notified of pts BP, states to recheck BP and medication can be given if still elevated.

## 2022-02-26 NOTE — FLOWSHEET NOTE
Patient up to bathroom for first time. Large successful void. Small amount of lochia noted no clots. Jaimie care done per patient. Patient back to bed. Fundus firm U/-1. Due to void x1.

## 2022-02-26 NOTE — PROGRESS NOTES
Progress note    Subjective:     Postoperative Day 1:  Delivery    Pain is well controlled with current medications. Bleeding wnl. The patient is ambulating well. The patient is tolerating a normal diet. Voiding spontaneously. Passing flatus. Magnesium was discontinued last night. Pt continues to have elevated BPs occasionally severe range. Denies HA, vision changes, CP, SOB, RUQ pain. Blood sugars have been well controlled last night and this morning, not requiring any sliding scale. Objective:     Vitals:    22 0752   BP: (!) 160/85   Pulse: 80   Resp: 16   Temp: 98.9 °F (37.2 °C)   SpO2: 100%           General:    alert, appears stated age and cooperative   Uterine Fundus:   firm   Incision:  Neuro:  Extremities:  covered, no significant drainage, no significant erythema  +2 patellar reflexes bilaterally  +1 pitting pedal edema        CBC   Lab Results   Component Value Date    HGB 10.7 (L) 2022        Assessment:     Status post  section. Pre-Eclampsia  Gestational diabetes    Plan:     S/p   - POD #1  - Continue routine post-op care  - DVT ppx with SCDs and Lovenox     Pre-Eclampsia  - Magnesium discontinued around midnight this AM  - BPs continue to be elevated.  Will start Procardia XL.   - Pt asymptomatic     Gestational diabetes  - Well controlled last night and this morning  - Will discontinue blood glucose checks at this point      Monika Ledesma DO 2022 11:16 AM

## 2022-02-26 NOTE — FLOWSHEET NOTE
Admitted to room 5A15 Via bed from L&D. Assessment complete, patient oriented to room. Plan of care discussed. Denies further needs at this time.

## 2022-02-26 NOTE — FLOWSHEET NOTE
Patient up sitting on couch during time of assessment. Vital signs checked then checked manually. Asked patient to lay in bed on left side. Dr. Akash Corbin updated new orders received. See mar. Will recheck BP 20 min after IV dose of med is given.  Lucia Myers RN

## 2022-02-26 NOTE — PLAN OF CARE
Problem: Discharge Planning:  Goal: Discharged to appropriate level of care  Description: Discharged to appropriate level of care  2/26/2022 0438 by Shelli Epps RN  Outcome: Ongoing  Note: Working towards discharge. Problem: Pain:  Goal: Pain level will decrease  Description: Pain level will decrease  2/26/2022 0440 by Shelli Epps RN  Outcome: Ongoing  Note: Pain controlled with ice, heat, rest, reposition, and/or pain medication. Pain goal discussed with patient. Patient satisfied with pain interventions. Problem: Anxiety:  Goal: Level of anxiety will decrease  Description: Level of anxiety will decrease  2/26/2022 0438 by Shelli Epps RN  Outcome: Ongoing     Problem: Aspiration - Risk of:  Goal: Absence of aspiration  Description: Absence of aspiration  2/26/2022 9186 by Shelli Epps RN  Outcome: Ongoing     Problem: Venous Thromboembolism - Risk of:  Goal: Will show no signs or symptoms of venous thromboembolism  Description: Will show no signs or symptoms of venous thromboembolism  2/26/2022 0440 by Shelli Epps RN  Note: Negative homans. Problem: Discharge Planning:  Goal: Discharged to appropriate level of care  Description: Discharged to appropriate level of care  Outcome: Ongoing  Note: Working towards discharge. Problem: Fluid Volume - Imbalance:  Goal: Absence of postpartum hemorrhage signs and symptoms  Description: Absence of postpartum hemorrhage signs and symptoms  Outcome: Ongoing  Note: Fundus firm. Small amount of rubra lochia. Problem: Infection - Surgical Site:  Goal: Will show no infection signs and symptoms  Description: Will show no infection signs and symptoms  Outcome: Ongoing  Note: Afebrile, vital signs stable. No signs or symptoms of infection. Problem: Mood - Altered:  Goal: Mood stable  Description: Mood stable  Outcome: Ongoing  Note: Calm and cooperative.        Problem: Pain - Acute:  Goal: Pain level will decrease  Description: Pain level will decrease  2/26/2022 0440 by Shelli Epps RN  Outcome: Ongoing  Note: Pain controlled with ice, heat, rest, reposition, and/or pain medication. Pain goal discussed with patient. Patient satisfied with pain interventions. Problem: Venous Thromboembolism:  Goal: Will show no signs or symptoms of venous thromboembolism  Description: Will show no signs or symptoms of venous thromboembolism  2/26/2022 0440 by Shelli Epps RN  Note: Negative homans. Care plan reviewed with patient. Patient verbalize understanding of the plan of care and contribute to goal setting.

## 2022-02-26 NOTE — FLOWSHEET NOTE
Patient up bathroom for second time. Large successful void. Scant amount of lochia noted no clots. Jaimie care don per patient. Patient back to bed. Fundus midline firm at U/-1. Iv switched to INT  Per Dr. Liliana Finnegan.  April Angella Ramírez, RN

## 2022-02-26 NOTE — FLOWSHEET NOTE
Report given to [employer]. Lindsey cath still in place. pts BPs reviewed. Incision is clean dry and intact with binder in place. IVF running at 125 mL/hr. Pt transferred to 15 in stable condition with baby in arms and  following with belongings.

## 2022-02-27 PROCEDURE — 1200000000 HC SEMI PRIVATE

## 2022-02-27 PROCEDURE — 6370000000 HC RX 637 (ALT 250 FOR IP): Performed by: OBSTETRICS & GYNECOLOGY

## 2022-02-27 PROCEDURE — 6360000002 HC RX W HCPCS: Performed by: OBSTETRICS & GYNECOLOGY

## 2022-02-27 PROCEDURE — 6370000000 HC RX 637 (ALT 250 FOR IP): Performed by: STUDENT IN AN ORGANIZED HEALTH CARE EDUCATION/TRAINING PROGRAM

## 2022-02-27 RX ADMIN — NIFEDIPINE 30 MG: 30 TABLET, FILM COATED, EXTENDED RELEASE ORAL at 07:46

## 2022-02-27 RX ADMIN — OXYCODONE AND ACETAMINOPHEN 2 TABLET: 5; 325 TABLET ORAL at 20:46

## 2022-02-27 RX ADMIN — OXYCODONE AND ACETAMINOPHEN 2 TABLET: 5; 325 TABLET ORAL at 16:41

## 2022-02-27 RX ADMIN — OXYCODONE AND ACETAMINOPHEN 2 TABLET: 5; 325 TABLET ORAL at 08:25

## 2022-02-27 RX ADMIN — IBUPROFEN 800 MG: 800 TABLET, FILM COATED ORAL at 00:10

## 2022-02-27 RX ADMIN — DOCUSATE SODIUM 100 MG: 100 CAPSULE, LIQUID FILLED ORAL at 08:25

## 2022-02-27 RX ADMIN — ENOXAPARIN SODIUM 40 MG: 100 INJECTION SUBCUTANEOUS at 12:37

## 2022-02-27 RX ADMIN — IBUPROFEN 800 MG: 800 TABLET, FILM COATED ORAL at 08:25

## 2022-02-27 RX ADMIN — DOCUSATE SODIUM 100 MG: 100 CAPSULE, LIQUID FILLED ORAL at 20:47

## 2022-02-27 RX ADMIN — OXYCODONE AND ACETAMINOPHEN 2 TABLET: 5; 325 TABLET ORAL at 04:21

## 2022-02-27 RX ADMIN — OXYCODONE AND ACETAMINOPHEN 2 TABLET: 5; 325 TABLET ORAL at 12:37

## 2022-02-27 RX ADMIN — OXYCODONE AND ACETAMINOPHEN 2 TABLET: 5; 325 TABLET ORAL at 00:10

## 2022-02-27 RX ADMIN — IBUPROFEN 800 MG: 800 TABLET, FILM COATED ORAL at 16:42

## 2022-02-27 ASSESSMENT — PAIN SCALES - GENERAL
PAINLEVEL_OUTOF10: 7

## 2022-02-27 NOTE — PLAN OF CARE
Problem: Venous Thromboembolism - Risk of:  Goal: Will show no signs or symptoms of venous thromboembolism  Description: Will show no signs or symptoms of venous thromboembolism  2/26/2022 2029 by Fanta Lucia RN  Outcome: Ongoing  Note: Gleda Slider sign negative       Problem: Pain:  Goal: Pain level will decrease  Description: Pain level will decrease  2/26/2022 2029 by Fanta Lucia RN  Outcome: Ongoing  Note: Pain controlled with po meds. Discussed ice for perineal pain and/or incisional pain or the use of warm blanket/heating pad for uterine cramps. Pt states her pain goal 5/10 has been met. Problem: Discharge Planning:  Goal: Discharged to appropriate level of care  Description: Discharged to appropriate level of care  2/26/2022 2029 by Fanta Lucia RN  Outcome: Ongoing  Note: Discharge planning is ongoing. Problem: Fluid Volume - Imbalance:  Goal: Absence of postpartum hemorrhage signs and symptoms  Description: Absence of postpartum hemorrhage signs and symptoms  2/26/2022 2029 by Fanta Lucia RN  Outcome: Ongoing  Note: Vaginal bleeding WNL, Fundus firm and midline, no clots or foul odors. Problem: Infection - Surgical Site:  Goal: Will show no infection signs and symptoms  Description: Will show no infection signs and symptoms  2/26/2022 2029 by Fanta Lucia RN  Outcome: Ongoing  Note: Vital signs and assessments WNL. Surgical incision dressing is dry and intact. Problem: Mood - Altered:  Goal: Mood stable  Description: Mood stable  2/26/2022 2029 by Fanta Lucia RN  Outcome: Ongoing  Note: Bonding with baby, participating in infant care. Problem: Pain - Acute:  Goal: Pain level will decrease  Description: Pain level will decrease  2/26/2022 2029 by Fanta Lucia RN  Outcome: Ongoing  Note: Pain controlled with po meds. Discussed ice for perineal pain and/or incisional pain or the use of warm blanket/heating pad for uterine cramps.  Pt states her pain goal 5/10

## 2022-02-27 NOTE — PROGRESS NOTES
Progress note    Subjective:     Postoperative Day 2:  Delivery    Pain is moderately controlled with current medications. The patient is ambulating well. The patient is tolerating a normal diet. Voiding spontaneously. Passing flatus. Pt was started on Procardia XL yesterday for HTN. She did still require one dose of IV hydralazine yesterday afternoon after receiving the Procardia. Since that time though BPs have been only mildly elevated. Pt denies HA, CP, SOB, RUQ pain. Objective:     Vitals:    22 0746   BP: (!) 149/79   Pulse: 80   Resp: 16   Temp: 97.9 °F (36.6 °C)   SpO2: 95%         General:    alert, appears stated age and cooperative   Uterine Fundus:   firm   Incision:  Extremities:  no significant drainage, no significant erythema  +1 pitting pedal edema bilaterally        CBC   Lab Results   Component Value Date    WBC 17.8 (H) 2022    HGB 10.7 (L) 2022    HCT 33.2 (L) 2022     2022        Assessment:     Status post  section.    Pre-Eclampsia  Gestational diabetes      Plan:     S/p   - POD #2  - Continue routine post-op care  - DVT ppx with SCDs and Lovenox     Pre-Eclampsia  - Magnesium discontinued around midnight   - Will continue to monitor BPs on Procardia today since patient still required IV medication x1 yesterday  - Pt asymptomatic     Gestational diabetes  - Blood glucose checks discontinued on POD #1      Maryann Barclay, DO 2022 8:48 AM

## 2022-02-27 NOTE — PLAN OF CARE
Problem: Venous Thromboembolism - Risk of:  Goal: Will show no signs or symptoms of venous thromboembolism  Description: Will show no signs or symptoms of venous thromboembolism  2/27/2022 0908 by Lucia Chowdary RN  Outcome: Ongoing  Note: Negative homans. Problem: Pain:  Goal: Pain level will decrease  Description: Pain level will decrease  2/27/2022 0908 by Lucia Chowdary RN  Outcome: Ongoing  Note: Scheduled motrin and PRN percocet given with relief. Pain goal of 5 met. Icepack to abdominal incision. Problem: Discharge Planning:  Goal: Discharged to appropriate level of care  Description: Discharged to appropriate level of care  2/27/2022 0908 by Lucia Chowdary RN  Outcome: Ongoing  Note: Plan is to be discharged home with significant other. Problem: Fluid Volume - Imbalance:  Goal: Absence of postpartum hemorrhage signs and symptoms  Description: Absence of postpartum hemorrhage signs and symptoms  2/27/2022 0908 by Lucia Chowdary RN  Outcome: Ongoing  Note: Vaginal bleeding WNL, Fundus firm and midline, no clots or foul odors. Problem: Infection - Surgical Site:  Goal: Will show no infection signs and symptoms  Description: Will show no infection signs and symptoms  2/27/2022 0908 by Lucia Chowdary RN  Outcome: Ongoing  Note: Afebrile this shift. Problem: Mood - Altered:  Goal: Mood stable  Description: Mood stable  2/27/2022 0908 by Lucia Chowdary RN  Outcome: Ongoing  Note: Emotional support provided. Problem: Pain - Acute:  Goal: Pain level will decrease  Description: Pain level will decrease  2/27/2022 0908 by Lucia Chowdary RN  Outcome: Ongoing  Note: Scheduled motrin and PRN percocet given with relief. Pain goal of 5 met. Icepack to abdominal incision.       Problem: Venous Thromboembolism:  Goal: Will show no signs or symptoms of venous thromboembolism  Description: Will show no signs or symptoms of venous thromboembolism  2/27/2022 0908 by Lucia Chowdary RN  Outcome: Ongoing  Note: Negative homans. Care plan reviewed with patient and she contributes to goal setting and voices understanding of plan of care.

## 2022-02-28 VITALS
HEART RATE: 87 BPM | SYSTOLIC BLOOD PRESSURE: 135 MMHG | OXYGEN SATURATION: 98 % | WEIGHT: 152 LBS | HEIGHT: 57 IN | BODY MASS INDEX: 32.79 KG/M2 | TEMPERATURE: 98.5 F | DIASTOLIC BLOOD PRESSURE: 81 MMHG | RESPIRATION RATE: 20 BRPM

## 2022-02-28 PROCEDURE — 6370000000 HC RX 637 (ALT 250 FOR IP): Performed by: OBSTETRICS & GYNECOLOGY

## 2022-02-28 PROCEDURE — 6370000000 HC RX 637 (ALT 250 FOR IP): Performed by: STUDENT IN AN ORGANIZED HEALTH CARE EDUCATION/TRAINING PROGRAM

## 2022-02-28 RX ORDER — OXYCODONE HYDROCHLORIDE AND ACETAMINOPHEN 5; 325 MG/1; MG/1
1 TABLET ORAL EVERY 6 HOURS PRN
Qty: 28 TABLET | Refills: 0 | Status: SHIPPED | OUTPATIENT
Start: 2022-02-28 | End: 2022-03-07

## 2022-02-28 RX ORDER — DOCUSATE SODIUM 100 MG/1
100 CAPSULE, LIQUID FILLED ORAL 2 TIMES DAILY PRN
COMMUNITY
Start: 2022-02-28 | End: 2022-10-04

## 2022-02-28 RX ORDER — IBUPROFEN 200 MG
800 TABLET ORAL EVERY 8 HOURS PRN
COMMUNITY
Start: 2022-02-28 | End: 2022-10-04

## 2022-02-28 RX ADMIN — OXYCODONE HYDROCHLORIDE AND ACETAMINOPHEN 1 TABLET: 5; 325 TABLET ORAL at 09:09

## 2022-02-28 RX ADMIN — IBUPROFEN 800 MG: 800 TABLET, FILM COATED ORAL at 00:44

## 2022-02-28 RX ADMIN — NIFEDIPINE 30 MG: 30 TABLET, FILM COATED, EXTENDED RELEASE ORAL at 08:20

## 2022-02-28 RX ADMIN — OXYCODONE AND ACETAMINOPHEN 2 TABLET: 5; 325 TABLET ORAL at 00:44

## 2022-02-28 RX ADMIN — DOCUSATE SODIUM 100 MG: 100 CAPSULE, LIQUID FILLED ORAL at 08:21

## 2022-02-28 RX ADMIN — OXYCODONE HYDROCHLORIDE AND ACETAMINOPHEN 1 TABLET: 5; 325 TABLET ORAL at 04:52

## 2022-02-28 RX ADMIN — IBUPROFEN 800 MG: 800 TABLET, FILM COATED ORAL at 08:22

## 2022-02-28 ASSESSMENT — PAIN SCALES - GENERAL
PAINLEVEL_OUTOF10: 4
PAINLEVEL_OUTOF10: 1
PAINLEVEL_OUTOF10: 1
PAINLEVEL_OUTOF10: 7

## 2022-02-28 NOTE — LACTATION NOTE
Pt. Stated she has no questions or concerns at this time. Encouraged pt. To call lactation for any assistance.

## 2022-02-28 NOTE — PLAN OF CARE
Thromboembolism:  Goal: Will show no signs or symptoms of venous thromboembolism  Description: Will show no signs or symptoms of venous thromboembolism  2/27/2022 2100 by Cliff Peres RN  Outcome: Ongoing  Note: Angelica Crowe sign negative     Care plan reviewed with patient and she contributes to goal setting and voices understanding of plan of care.

## 2022-02-28 NOTE — FLOWSHEET NOTE
Texted Dr. Alverto Roman for the plan to continue with the Procardia XL 30 mg daily po. Texted back. Confirmed patient needs to continue and she sent prescription to [facility] on [address] in [address] where patent is picking up her percocet. . Patient aware. and verbalized understanding.  Marked on her AVS

## 2022-02-28 NOTE — PLAN OF CARE
Problem: Pain:  Goal: Pain level will decrease  Description: Pain level will decrease  2/28/2022 0927 by Kaye Yousif RN  Note: Patients pain goal is a 4 which is met with ice, abdominal binder and pain meds. Problem: Pain - Acute:  Goal: Pain level will decrease  Description: Pain level will decrease  2/28/2022 0927 by Kaye Yousif RN  Note: Patients pain goal is a 4 which is met with ice, abdominal binder and pain meds. Problem: Anxiety:  Goal: Level of anxiety will decrease  Description: Level of anxiety will decrease  2/27/2022 2100 by Ryne Luna RN  Outcome: Completed     Problem: Aspiration - Risk of:  Goal: Absence of aspiration  Description: Absence of aspiration  2/27/2022 2100 by Ryne Luna RN  Outcome: Completed     Problem: Venous Thromboembolism - Risk of:  Goal: Will show no signs or symptoms of venous thromboembolism  Description: Will show no signs or symptoms of venous thromboembolism  2/28/2022 0927 by Kaye Yousif RN  Outcome: Completed  Note: Patient has shown no signs or symptoms of  venous thromboembolism. Homans negative. Problem: Discharge Planning:  Goal: Discharged to appropriate level of care  Description: Discharged to appropriate level of care  2/27/2022 2100 by Ryne Luna RN  Outcome: Completed     Problem: Discharge Planning:  Goal: Discharged to appropriate level of care  Description: Discharged to appropriate level of care  2/28/2022 0927 by Kaye Yousif RN  Outcome: Completed  Note: Patient discharge today to home . Follow up appointments made. Problem: Fluid Volume - Imbalance:  Goal: Absence of postpartum hemorrhage signs and symptoms  Description: Absence of postpartum hemorrhage signs and symptoms  2/28/2022 0927 by Kaye Yousif RN  Outcome: Completed  Note: Vaginal bleeding WNL, Fundus firm and midline, no clots or foul odors.       Problem: Infection - Surgical Site:  Goal: Will show no infection signs and symptoms  Description: Will show no infection signs and symptoms  2/28/2022 0927 by Jeancarlos Cooper RN  Outcome: Completed  Note: Vital signs and assessments WNL. Problem: Mood - Altered:  Goal: Mood stable  Description: Mood stable  2/28/2022 0927 by Jeancarlos Cooper RN  Outcome: Completed  Note: PPD score is a 6 out of 10. Reviewed with patient. Problem: Venous Thromboembolism:  Goal: Will show no signs or symptoms of venous thromboembolism  Description: Will show no signs or symptoms of venous thromboembolism  2/28/2022 0927 by Jeancarlos Cooper RN  Outcome: Completed  Note: Patient has shown no signs or symptoms of  venous thromboembolism. Homans negative. Care plan reviewed with patient and she contributes to goal setting and voices understanding of plan of care.

## 2022-02-28 NOTE — FLOWSHEET NOTE
Post birth warning signs education paper given and reviewed, teaching complete. [place] postpartum depression screening discussed with patient, instructed to contact her healthcare provider if her score is > 10. Patients PPD score 6 . Patient voiced understanding. Mother's blood type is A+  Baby's blood type was not drawn. .  Mother did not receive Rhogam ..

## 2022-02-28 NOTE — PROGRESS NOTES
Subjective:     Postpartum Day 3:  Delivery    BPs better controlled since starting Procardia. Pain controlled    Objective:    VITALS:  /72   Pulse 70   Temp 98.5 °F (36.9 °C) (Oral)   Resp 14   Ht 4' 8.5\" (1.435 m)   Wt 152 lb (68.9 kg)   SpO2 96%   Breastfeeding Unknown   BMI 33.48 kg/m²     Vitals:    22 0455   BP: 124/72   Pulse: 70   Resp: 14   Temp: 98.5 °F (36.9 °C)   SpO2: 96%         General:    alert, appears stated age and cooperative   Bowel Sounds:  active           Incision:  healing well, no significant drainage, no dehiscence, no significant erythema, dressing in place         DATA: CBC   Lab Results   Component Value Date    WBC 17.8 (H) 2022    HGB 10.7 (L) 2022    HCT 33.2 (L) 2022     2022        Assessment:     Status post  section. Doing well postoperatively. Plan:     Discharge home with standard precautions and return to clinic in 4-6 weeks.       Ed Lara MD

## 2022-02-28 NOTE — DISCHARGE SUMMARY
C/Section Discharge Summary    Gestational Age:36w0d    Antepartum complications: Pre-eclampsia with severe features, GDM-A1    Date of Delivery: 2022      Condition: Good    Type of Delivery:  (see operative report)    Labs: CBC   Lab Results   Component Value Date    WBC 17.8 (H) 2022    HGB 10.7 (L) 2022    HCT 33.2 (L) 2022     2022        Intrapartum complications: None    Postpartum complications: none    The patient is ambulating well. The patient is tolerating a normal diet. Discharge Medication:      Medication List      START taking these medications    docusate sodium 100 MG capsule  Commonly known as: COLACE  Take 1 capsule by mouth 2 times daily as needed for Constipation     ibuprofen 200 MG tablet  Commonly known as: ADVIL;MOTRIN  Take 4 tablets by mouth every 8 hours as needed for Pain     oxyCODONE-acetaminophen 5-325 MG per tablet  Commonly known as: Percocet  Take 1 tablet by mouth every 6 hours as needed for Pain for up to 7 days. Intended supply: 7 days.  Take lowest dose possible to manage pain        CONTINUE taking these medications    acetaminophen 500 MG tablet  Commonly known as: TYLENOL     aspirin 81 MG EC tablet     PRENATAL 1 PO           Where to Get Your Medications      These medications were sent to [facility] #[id number] - [facility] [address] [phone] - F [phone]  [facility] [address] [place] [id number]    Phone: [phone]   · oxyCODONE-acetaminophen 5-325 MG per tablet     You can get these medications from any pharmacy    You don't need a prescription for these medications  · docusate sodium 100 MG capsule  · ibuprofen 200 MG tablet          Discharge Date:     Plan:   Follow up in 1 week(s) for incision check    Rober Sinha MD

## 2022-05-12 NOTE — PROCEDURES
Department of Obstetrics and Gynecology  Labor and Delivery  NST Triage Note      Pt Name: Hilda Smith  MRN: [id number] Kimcaio #: [de-identified]  YOB: 1995  Procedure Performed By: Shireen Bowser MD, MD        SUBJECTIVE: Patient presented at 34 weeks complaining of nausea and vomiting. + fetal movement. denies contraction, vaginal bleeding and  leaking fluid. OBJECTIVE    Vitals:  BP (!) 140/87   Pulse 96   Temp 100 °F (37.8 °C) (Temporal)   Resp 18   Ht 4' 8.5\" (1.435 m)   Wt 146 lb (66.2 kg)   BMI 32.16 kg/m²     Fetal heart rate:         Baseline Heart Rate:  130        Accelerations:  present       Decelerations:  absent       Variability:  moderate    Contraction frequency: q5min and then spaced out    The NST was reactive. SVE: unchanged      ASSESSMENT & PLAN:  Patient was given symptomatic relief. Her symptoms improved. Reassurance provided. Labor precautions given. Discharged to home in a stable condition and instructed to follow up at her next scheduled appointment.     Shireen Bowser MD 5/12/2022 10:22 AM

## 2022-10-04 ENCOUNTER — OFFICE VISIT (OUTPATIENT)
Dept: INTERNAL MEDICINE CLINIC | Age: 27
End: 2022-10-04

## 2022-10-04 VITALS
SYSTOLIC BLOOD PRESSURE: 120 MMHG | HEART RATE: 84 BPM | DIASTOLIC BLOOD PRESSURE: 80 MMHG | WEIGHT: 140.8 LBS | BODY MASS INDEX: 30.38 KG/M2 | HEIGHT: 57 IN | TEMPERATURE: 98.7 F

## 2022-10-04 DIAGNOSIS — R74.8 ALKALINE PHOSPHATASE ELEVATION: ICD-10-CM

## 2022-10-04 DIAGNOSIS — R01.1 MURMUR: ICD-10-CM

## 2022-10-04 DIAGNOSIS — K80.80 BILIARY CALCULUS OF OTHER SITE WITHOUT OBSTRUCTION: ICD-10-CM

## 2022-10-04 DIAGNOSIS — Z86.32 PERSONAL HISTORY OF GESTATIONAL DIABETES: ICD-10-CM

## 2022-10-04 DIAGNOSIS — R00.2 PALPITATIONS: ICD-10-CM

## 2022-10-04 DIAGNOSIS — F41.9 ANXIETY: ICD-10-CM

## 2022-10-04 DIAGNOSIS — D64.9 NORMOCYTIC ANEMIA: ICD-10-CM

## 2022-10-04 DIAGNOSIS — R19.7 DIARRHEA, UNSPECIFIED TYPE: Primary | ICD-10-CM

## 2022-10-04 DIAGNOSIS — D72.829 LEUKOCYTOSIS, UNSPECIFIED TYPE: ICD-10-CM

## 2022-10-04 RX ORDER — SERTRALINE HYDROCHLORIDE 25 MG/1
25 TABLET, FILM COATED ORAL DAILY
Qty: 30 TABLET | Refills: 3 | Status: SHIPPED | OUTPATIENT
Start: 2022-10-04 | End: 2022-11-01

## 2022-10-04 SDOH — ECONOMIC STABILITY: FOOD INSECURITY: WITHIN THE PAST 12 MONTHS, YOU WORRIED THAT YOUR FOOD WOULD RUN OUT BEFORE YOU GOT MONEY TO BUY MORE.: NEVER TRUE

## 2022-10-04 SDOH — ECONOMIC STABILITY: FOOD INSECURITY: WITHIN THE PAST 12 MONTHS, THE FOOD YOU BOUGHT JUST DIDN'T LAST AND YOU DIDN'T HAVE MONEY TO GET MORE.: NEVER TRUE

## 2022-10-04 ASSESSMENT — ENCOUNTER SYMPTOMS
WHEEZING: 0
VOMITING: 0
DIARRHEA: 1
CONSTIPATION: 0
SORE THROAT: 0
BACK PAIN: 0
EYE PAIN: 1
SHORTNESS OF BREATH: 0
EYE ITCHING: 1
BLOOD IN STOOL: 0
SINUS PRESSURE: 0
COUGH: 0
ABDOMINAL PAIN: 0
COLOR CHANGE: 0
SINUS PAIN: 0
NAUSEA: 1

## 2022-10-04 ASSESSMENT — PATIENT HEALTH QUESTIONNAIRE - PHQ9
SUM OF ALL RESPONSES TO PHQ9 QUESTIONS 1 & 2: 2
SUM OF ALL RESPONSES TO PHQ QUESTIONS 1-9: 2
2. FEELING DOWN, DEPRESSED OR HOPELESS: 1
1. LITTLE INTEREST OR PLEASURE IN DOING THINGS: 1
SUM OF ALL RESPONSES TO PHQ QUESTIONS 1-9: 2

## 2022-10-04 ASSESSMENT — SOCIAL DETERMINANTS OF HEALTH (SDOH): HOW HARD IS IT FOR YOU TO PAY FOR THE VERY BASICS LIKE FOOD, HOUSING, MEDICAL CARE, AND HEATING?: NOT HARD AT ALL

## 2022-10-04 NOTE — PROGRESS NOTES
1995    Chief Complaint   Patient presents with    New Patient     Postpartum anxiety     Diarrhea     Diarrhea immediately after eating      Opening Statement:  Patient is a 64-year-old female with a past medical history of gestational diabetes, preeclampsia with severe features in 2022, heart murmur, multiple miscarriages, previous dilation curettage,  section on 2022, and anxiety who presents to establish care. Establish Care:  No medical problems except as listed above. No recent illnesses, injuries, hospitalizations, or falls except as above. Sleeping poorly secondary to caring for her baby. Affirms intermittent anger without clear cause. Patient does not want to talk to counselor right now but would like to think about it. Would like medication that she can take while breast feeding. Has \"good days and bad days. \" Affirms significant stress as her  goes to work and school and she has to care for her baby by herself. Affirms anxiety and depression since the birth of her son. Denies suicidal thoughts or ideation. CMP in 2022 was notable for a BUN of 4, potassium 3.3, alkaline phosphatase of 127, total protein 5.7, LV 3.2, and was otherwise essentially unremarkable. CBC on 2022 was notable for normocytic anemia with a hemoglobin of 10.3, WBC of 17.8, and a normal platelet count. Diarrhea:   Has been having diarrhea since . Will get diarrhea almost every time after eating. Has not noticed any differences between different foods. No blood or mucus. Has between one and three watery bowel movements per day. Mother had identical symptoms which were determined to be related to her gallbladder. Mom had cholecystectomy. However, unsure if this resolved symptoms. Patient will ask her Mom. Infertility:   Needed progesterone for 16 weeks to maintain her pregnancy. Screening:   Last Pap smear on 2022.    Has never had an echocardiogram.     Murmur:  History of. Has never had an echocardiogram.       Past Medical History:   Diagnosis Date    Diabetes mellitus ([facility] [address]     Gestational diabetes    Heart abnormality     Heart murmur    Infertility, female     history multiple miscarriages    Mental disorder     anxiety       Past Surgical History:   Procedure Laterality Date     SECTION N/A 2022     SECTION performed by Fartun Hughes MD at [facility] OR    DILATION AND CURETTAGE OF UTERUS         Current Outpatient Medications   Medication Sig Dispense Refill    BREWERS YEAST PO Take 3 capsules by mouth 3 times daily      LECITHIN PO Take by mouth 3 times daily      sertraline (ZOLOFT) 25 MG tablet Take 1 tablet by mouth daily 30 tablet 3    Prenatal MV-Min-Fe Fum-FA-DHA (PRENATAL 1 PO) Take by mouth       No current facility-administered medications for this visit.        Allergies   Allergen Reactions    Pcn [Penicillins]      AS A CHILD       Social History     Socioeconomic History    Marital status:      Spouse name: Not on file    Number of children: Not on file    Years of education: Not on file    Highest education level: Not on file   Occupational History    Not on file   Tobacco Use    Smoking status: Never    Smokeless tobacco: Never   Vaping Use    Vaping Use: Never used   Substance and Sexual Activity    Alcohol use: Yes     Comment: socially    Drug use: Never    Sexual activity: Yes     Partners: Male   Other Topics Concern    Not on file   Social History Narrative    Not on file     Social Determinants of Health     Financial Resource Strain: Low Risk     Difficulty of Paying Living Expenses: Not hard at all   Food Insecurity: No Food Insecurity    Worried About Running Out of Food in the Last Year: Never true    Ran Out of Food in the Last Year: Never true   Transportation Needs: Not on file   Physical Activity: Not on file   Stress: Not on file   Social Connections: Not on file   Intimate Partner Violence: Not on file   Housing Stability: Not on file       Family History   Problem Relation Age of Onset    Diabetes Mother     Depression Mother     Diabetes Father     Hypertension Father        Review of Systems   Constitutional:  Negative for chills and fever. HENT:  Negative for sinus pressure, sinus pain and sore throat. Eyes:  Positive for pain (Due to contacts) and itching (Due to contacts). Negative for visual disturbance. Respiratory:  Negative for cough, shortness of breath and wheezing. Cardiovascular:  Positive for palpitations (With anxiety). Negative for chest pain and leg swelling. Gastrointestinal:  Positive for diarrhea and nausea. Negative for abdominal pain, blood in stool, constipation and vomiting. Endocrine: Negative for cold intolerance and heat intolerance. Genitourinary:  Negative for difficulty urinating, dysuria and hematuria. Musculoskeletal:  Negative for back pain and neck pain. Skin:  Negative for color change, rash and wound. Allergic/Immunologic: Negative for environmental allergies and immunocompromised state. Neurological:  Negative for dizziness and headaches. Hematological:  Does not bruise/bleed easily. Psychiatric/Behavioral:  Positive for dysphoric mood. Negative for self-injury and suicidal ideas. The patient is nervous/anxious. Vitals:    10/04/22 1509   BP: 120/80   Pulse: 84   Temp: 98.7 °F (37.1 °C)        Physical Exam  Constitutional:       General: She is not in acute distress. Appearance: Normal appearance. She is obese. She is not toxic-appearing. HENT:      Head: Normocephalic and atraumatic. Right Ear: External ear normal.      Left Ear: External ear normal.      Mouth/Throat:      Mouth: Mucous membranes are moist.      Pharynx: Oropharynx is clear. Eyes:      General: No scleral icterus. Pupils: Pupils are equal, round, and reactive to light.    Cardiovascular:      Rate and Rhythm: Normal rate and regular rhythm. Pulses: Normal pulses. Heart sounds: Normal heart sounds. Pulmonary:      Effort: Pulmonary effort is normal. No respiratory distress. Breath sounds: Normal breath sounds. No wheezing or rales. Abdominal:      General: Abdomen is flat. There is no distension. Palpations: Abdomen is soft. Tenderness: There is no abdominal tenderness. There is no guarding. Skin:     General: Skin is warm and dry. Capillary Refill: Capillary refill takes less than 2 seconds. Neurological:      Mental Status: She is alert. Psychiatric:         Mood and Affect: Mood normal.         Behavior: Behavior normal.         Thought Content: Thought content normal.         Judgment: Judgment normal.        Diagnostic data:   I have reviewed recent diagnostic testing including labs with patient today. Assessment and Plan:     Diagnosis Orders   1. Diarrhea, unspecified type  GI Bacterial Pathogens By PCR      2. Palpitations  EKG 12 lead    Comprehensive Metabolic Panel, Fasting    Magnesium    Phosphorus    ECHO 2D WO Color Doppler Complete      3. Biliary calculus of other site without obstruction  US GALLBLADDER RUQ      4. Normocytic anemia  CBC with Auto Differential      5. Leukocytosis, unspecified type  CBC with Auto Differential      6. Anxiety  sertraline (ZOLOFT) 25 MG tablet    TSH With Reflex Ft4      7. Postpartum depression  sertraline (ZOLOFT) 25 MG tablet    TSH With Reflex Ft4      8. Personal history of gestational diabetes  Lipid Panel    Hemoglobin A1C      9. Murmur  ECHO 2D WO Color Doppler Complete      10. Alkaline phosphatase elevation          1. Chronic diarrhea, unspecified. Has been having diarrhea since February of this 2022. Will get diarrhea almost every time after eating. Has not noticed any differences between different foods. No blood or mucus. Has between one and three watery bowel movements per day.    Patient's mother had identical symptoms which were determined to be due to her gallbladder. Differential includes biliary colic and lactose intolerance. Chronic bacterial infection is less likely but cannot be excluded. Will obtain ultrasound of the gallbladder and GI panel. If GI panel and ultrasound of the gallbladder negative, test for lactose intolerance. If lactose intolerance ruled out, HIDA scan or refer to GI. Follow-up appointment in 1 month. 2.  Palpitations, unspecified. Probably related to anxiety. Patient is a young female with no risk factors for dysrhythmias or vascular disease. However, cannot rule out complications related to pregnancy given medically difficult gestation and labor. Will obtain EKG, CMP, magnesium, phosphorus, TSH with reflex free T4, and echocardiogram and then reevaluate. 3.  Normocytic Anemia, unspecified. CBC on 2022 was notable for normocytic anemia with a hemoglobin of 10.3, WBC of 17.8, and a normal platelet count. Probably secondary to complicated gestation and labor. Repeat CBC and then reevaluate. 4.  Leukocytosis, unspecified. Probably reactive from  section. Repeat CBC and then reevaluate. 5.  Postpartum Anxiety and Depression. Will start on Zoloft 25 mg daily. Acceptable safety profile in breast-feeding. Follow-up in 1 month and titrate upwards as tolerated. 6.  Personal history of gestational diabetes. CMP, hemoglobin A1c, and repeat lipid panel. Follow-up in 1 month. 7.  Murmur, unspecified. Not noted on my physical exam.  Echocardiogram as above. 8.  Alkaline phosphatase elevation, unspecified. Repeat CMP and then reevaluate. Staffed with: Dr. Sunil Chavira.  [address]  Dept: [phone]  Dept Fax: 24 : [phone]

## 2022-10-06 NOTE — PROGRESS NOTES
Per Dr Britton Ashley Heal to change order for echocardiogram (scheduled for 10-7-22)  from limited study to complete study. Will notify patient upon arrival of change.

## 2022-10-07 ENCOUNTER — HOSPITAL ENCOUNTER (OUTPATIENT)
Dept: NON INVASIVE DIAGNOSTICS | Age: 27
Discharge: HOME | End: 2022-10-07
Payer: MEDICARE

## 2022-10-07 ENCOUNTER — HOSPITAL ENCOUNTER (OUTPATIENT)
Dept: ULTRASOUND IMAGING | Age: 27
Discharge: HOME | End: 2022-10-07
Payer: MEDICARE

## 2022-10-07 ENCOUNTER — HOSPITAL ENCOUNTER (OUTPATIENT)
Age: 27
Discharge: HOME | End: 2022-10-07
Payer: MEDICARE

## 2022-10-07 DIAGNOSIS — R00.2 PALPITATIONS: ICD-10-CM

## 2022-10-07 DIAGNOSIS — K80.80 BILIARY CALCULUS OF OTHER SITE WITHOUT OBSTRUCTION: ICD-10-CM

## 2022-10-07 DIAGNOSIS — R01.1 MURMUR: ICD-10-CM

## 2022-10-07 LAB
LV EF: 58 %
LVEF MODALITY: NORMAL

## 2022-10-07 PROCEDURE — 93005 ELECTROCARDIOGRAM TRACING: CPT

## 2022-10-07 PROCEDURE — 93306 TTE W/DOPPLER COMPLETE: CPT

## 2022-10-07 PROCEDURE — 76705 ECHO EXAM OF ABDOMEN: CPT

## 2022-10-08 LAB
EKG ATRIAL RATE: 66 BPM
EKG P AXIS: 35 DEGREES
EKG P-R INTERVAL: 150 MS
EKG Q-T INTERVAL: 416 MS
EKG QRS DURATION: 66 MS
EKG QTC CALCULATION (BAZETT): 436 MS
EKG R AXIS: 20 DEGREES
EKG T AXIS: 12 DEGREES
EKG VENTRICULAR RATE: 66 BPM

## 2022-10-08 PROCEDURE — 93010 ELECTROCARDIOGRAM REPORT: CPT | Performed by: INTERNAL MEDICINE

## 2022-10-17 ENCOUNTER — TELEPHONE (OUTPATIENT)
Dept: INTERNAL MEDICINE CLINIC | Age: 27
End: 2022-10-17

## 2022-10-17 NOTE — TELEPHONE ENCOUNTER
LEFT A VOICEMAIL THAT WE DO NOT HAVE ANY PROVIDERS HERE ON FRIDAYS AND ONLY OPEN TILL NOON.   I KEPT HER APPT FOR 11/1/22 AS IS AND GAVE HER THE PHONE # TO GET US GIRLS UPFRONT IF NEEDING TO R/S

## 2022-11-01 ENCOUNTER — OFFICE VISIT (OUTPATIENT)
Dept: INTERNAL MEDICINE CLINIC | Age: 27
End: 2022-11-01

## 2022-11-01 VITALS
BODY MASS INDEX: 30.2 KG/M2 | WEIGHT: 140 LBS | DIASTOLIC BLOOD PRESSURE: 74 MMHG | HEART RATE: 84 BPM | TEMPERATURE: 98.4 F | HEIGHT: 57 IN | SYSTOLIC BLOOD PRESSURE: 106 MMHG

## 2022-11-01 DIAGNOSIS — F41.9 ANXIETY: Primary | ICD-10-CM

## 2022-11-01 DIAGNOSIS — F32.89 OTHER DEPRESSION: ICD-10-CM

## 2022-11-01 DIAGNOSIS — H57.11 EYE DISCOMFORT, RIGHT: ICD-10-CM

## 2022-11-01 DIAGNOSIS — R19.7 DIARRHEA, UNSPECIFIED TYPE: ICD-10-CM

## 2022-11-01 RX ORDER — LOPERAMIDE HYDROCHLORIDE 2 MG/1
2 TABLET ORAL 4 TIMES DAILY PRN
Qty: 120 TABLET | Refills: 0 | Status: SHIPPED | OUTPATIENT
Start: 2022-11-01 | End: 2022-12-01

## 2022-11-01 RX ORDER — ALPRAZOLAM 0.25 MG/1
0.25 TABLET ORAL 3 TIMES DAILY PRN
Qty: 21 TABLET | Refills: 0 | Status: SHIPPED | OUTPATIENT
Start: 2022-11-01 | End: 2022-11-08

## 2022-11-01 RX ORDER — ETONOGESTREL 68 MG/1
IMPLANT SUBCUTANEOUS
COMMUNITY

## 2022-11-01 RX ORDER — TETRAHYDROZOLINE HCL 0.05 %
1 DROPS OPHTHALMIC (EYE) 3 TIMES DAILY
Qty: 10 ML | Refills: 4 | Status: SHIPPED | OUTPATIENT
Start: 2022-11-01

## 2022-11-01 NOTE — PROGRESS NOTES
1995    Chief Complaint   Patient presents with    Anxiety     4 weeks - started Zoloft 10/4    Depression    Eye Pain     Right - may have scratched it        HPI  The patient is a 32year old female with a  PMH of gestational diabetes, preeclampsia with severe features in 2022, heart murmur, multiple miscarriages, previous dilation curettage,  section on 2022, and anxiety who presents to clinic for follow up. Anxiety/depression - The patient reports her anxiety is still present, but seems to be slightly better controlled. She has been compliant on her sertraline, and does not seem to notice its affect. She denies any major anxiety coping methods. She denies any depression. Right eye pain - She reports sudden onset right eye pain that started yesterday morning. She denies any inciting event. She denies any associated changes in vision or blurriness. The patient states her eye was \"crusted\" this morning. Currently she denies any eye pain, but endorses irritation, specifically to light. Diarrhea - Persistent diarrhea. She denies any fevers, chills, N/V or sick contacts. Previous labs not indicative of infectious process. GI panel negative. Unlikely viral etiology given extended duration. Suspect her condition may be related to diet or malabsorptive process. The patient was advised to avoid complex carbs and monitor diet for triggers. She was started on imodium for symptomatic relief.      Past Medical History:   Diagnosis Date    Diabetes mellitus ([facility] [address]     Gestational diabetes    Heart abnormality     Heart murmur    Infertility, female     history multiple miscarriages    Mental disorder     anxiety       Past Surgical History:   Procedure Laterality Date     SECTION N/A 2022     SECTION performed by Evie Brown MD at [facility]    DILATION AND CURETTAGE OF UTERUS         Current Outpatient Medications   Medication Sig Dispense Refill    etonogestrel (Lalitha Hernandez) 68 MG implant implant 1  by subdermal route Q3yrs      loperamide (IMODIUM A-D) 2 MG tablet Take 1 tablet by mouth 4 times daily as needed for Diarrhea 120 tablet 0    tetrahydrozoline (EYE DROPS) 0.05 % ophthalmic solution Place 1 drop into the right eye 3 times daily 10 mL 4    ALPRAZolam (XANAX) 0.25 MG tablet Take 1 tablet by mouth 3 times daily as needed for Anxiety for up to 7 days. 21 tablet 0    sertraline (ZOLOFT) 50 MG tablet Take 1 tablet by mouth daily 90 tablet 0    BREWERS YEAST PO Take 3 capsules by mouth 3 times daily      LECITHIN PO Take by mouth 3 times daily      Prenatal MV-Min-Fe Fum-FA-DHA (PRENATAL 1 PO) Take by mouth       No current facility-administered medications for this visit.        Allergies   Allergen Reactions    Pcn [Penicillins]      AS A CHILD       Social History     Socioeconomic History    Marital status:      Spouse name: Not on file    Number of children: Not on file    Years of education: Not on file    Highest education level: Not on file   Occupational History    Not on file   Tobacco Use    Smoking status: Never    Smokeless tobacco: Never   Vaping Use    Vaping Use: Never used   Substance and Sexual Activity    Alcohol use: Yes     Comment: socially    Drug use: Never    Sexual activity: Yes     Partners: Male   Other Topics Concern    Not on file   Social History Narrative    Not on file     Social Determinants of Health     Financial Resource Strain: Low Risk     Difficulty of Paying Living Expenses: Not hard at all   Food Insecurity: No Food Insecurity    Worried About Running Out of Food in the Last Year: Never true    Ran Out of Food in the Last Year: Never true   Transportation Needs: Not on file   Physical Activity: Not on file   Stress: Not on file   Social Connections: Not on file   Intimate Partner Violence: Not on file   Housing Stability: Not on file       Family History   Problem Relation Age of Onset    Diabetes Mother     Depression Mother Diabetes Father     Hypertension Father        Review of Systems - General ROS: negative for - chills or fever  Psychological ROS: positive for - anxiety and depression  Hematological and Lymphatic ROS: No history of blood clots or bleeding disorder. Respiratory ROS: no cough, shortness of breath, or wheezing  Cardiovascular ROS: no chest pain or dyspnea on exertion, tolerates a flight of stairs, vacuuming  Gastrointestinal ROS: no abdominal pain, change in bowel habits, or black or bloody stools  Genito-Urinary ROS: no dysuria, trouble voiding, or hematuria  Musculoskeletal ROS: negative for - muscle pain or muscular weakness  Neurological ROS: negative for - headaches, numbness/tingling, seizures or weakness  Dermatological ROS: negative for - rash or skin lesion changes    Blood pressure 106/74, pulse 84, temperature 98.4 °F (36.9 °C), height 4' 8.5\" (1.435 m), weight 140 lb (63.5 kg), unknown if currently breastfeeding. Physical Examination: General appearance -alert, well appearing, and in no distress  Mental status - alert, oriented to person, place, and time  Head - atraumatic, normocephalic  Eyes - pupils equal and reactive to light, extraocular muscles intact  Ears - external ears are normal, hearing is grossly normal  Mouth - oral pharynx clear, mucous membranes moist  Neck - supple, no significant adenopathy  Chest - clear to auscultation, no wheezes, rales or rhonchi, symmetric air entry  Heart - normal rate, regular rhythm, normal S1, S2, no murmurs, rubs, clicks or gallops  Abdomen -soft, nontender, nondistended  Neurological - alert, oriented, cranial nerves II-XII intact, motor and sensation are grossly intact bilateral upper and lower extremities. Extremities - peripheral pulses normal, no pedal edema, no clubbing or cyanosis  Skin - warm and dry    Diagnostic data:   I have reviewed recent diagnostic testing including labs with patient today. Assessment and Plan:     Diagnosis Orders   1. Anxiety  ALPRAZolam (XANAX) 0.25 MG tablet    sertraline (ZOLOFT) 50 MG tablet      2. Other depression        3. Eye discomfort, right  tetrahydrozoline (EYE DROPS) 0.05 % ophthalmic solution      4. Diarrhea, unspecified type  loperamide (IMODIUM A-D) 2 MG tablet        Anxiety/depression: stable. Previously started on sertraline 25 mg p.o daily. Patient unsure if dosage effective. Reports continued, although slightly improved anxiety. Denies any depression. She has been on this dosage for 4 weeks, discussed SSRI's take a minimum of 6 weeks before full affect is seen. Patient verbalized, but requests increase due to upcoming flight and concerns for worsening anxiety. Will increase sertraline to 50 mg p.o daily. Additionally give xanax 0.25 mg as needed for 7 days. Follow up in 6 weeks. Right eye pain: Unclear etiology. Suspect corneal abrasion. Reports sudden onset right eye pain. Denies inciting trauma. Does wear contacts. Reports associated irritation to sunlet but denies changes in vision or blurriness. No sick contacts. No red conjunctiva. Vision intact. Will start eye drops three times daily. If worsening symptoms, will start erythromycin. Diarrhea: chronic, likely malabsorptive process. Labs not indicative of infection. GI panel negative RUQ US demonstrated cholelithiasis with no acute cholecystitis. Advised monitoring diet. Started imodium. Monitor. Consider GI consult if no improvement. Electronically signed by Monica Romeo DO on 11/1/2022 at 3:53 PM   Staffed with: Dr. Ramandeep Nice MD    [address] [address] [address]  Dept: [phone]  Dept Fax: [phone] : [phone]

## 2022-12-05 DIAGNOSIS — F41.9 ANXIETY: ICD-10-CM

## 2022-12-06 DIAGNOSIS — F41.9 ANXIETY: ICD-10-CM

## 2022-12-20 ENCOUNTER — OFFICE VISIT (OUTPATIENT)
Dept: INTERNAL MEDICINE CLINIC | Age: 27
End: 2022-12-20
Payer: MEDICARE

## 2022-12-20 VITALS
DIASTOLIC BLOOD PRESSURE: 84 MMHG | SYSTOLIC BLOOD PRESSURE: 118 MMHG | TEMPERATURE: 97.3 F | OXYGEN SATURATION: 97 % | HEART RATE: 98 BPM | WEIGHT: 143.2 LBS | BODY MASS INDEX: 31.54 KG/M2

## 2022-12-20 DIAGNOSIS — F32.89 OTHER DEPRESSION: ICD-10-CM

## 2022-12-20 DIAGNOSIS — E78.2 MODERATE MIXED HYPERLIPIDEMIA NOT REQUIRING STATIN THERAPY: ICD-10-CM

## 2022-12-20 DIAGNOSIS — F41.9 ANXIETY: Primary | ICD-10-CM

## 2022-12-20 DIAGNOSIS — J06.9 VIRAL URI: ICD-10-CM

## 2022-12-20 PROCEDURE — 99214 OFFICE O/P EST MOD 30 MIN: CPT | Performed by: STUDENT IN AN ORGANIZED HEALTH CARE EDUCATION/TRAINING PROGRAM

## 2022-12-20 PROCEDURE — 1036F TOBACCO NON-USER: CPT | Performed by: STUDENT IN AN ORGANIZED HEALTH CARE EDUCATION/TRAINING PROGRAM

## 2022-12-20 PROCEDURE — G8417 CALC BMI ABV UP PARAM F/U: HCPCS | Performed by: STUDENT IN AN ORGANIZED HEALTH CARE EDUCATION/TRAINING PROGRAM

## 2022-12-20 PROCEDURE — G8427 DOCREV CUR MEDS BY ELIG CLIN: HCPCS | Performed by: STUDENT IN AN ORGANIZED HEALTH CARE EDUCATION/TRAINING PROGRAM

## 2022-12-20 PROCEDURE — G8484 FLU IMMUNIZE NO ADMIN: HCPCS | Performed by: STUDENT IN AN ORGANIZED HEALTH CARE EDUCATION/TRAINING PROGRAM

## 2022-12-20 ASSESSMENT — ENCOUNTER SYMPTOMS
NAUSEA: 0
WHEEZING: 0
RHINORRHEA: 1
EYE ITCHING: 1
TROUBLE SWALLOWING: 0
SINUS PAIN: 0
CONSTIPATION: 0
SHORTNESS OF BREATH: 0
EYE PAIN: 0
CHEST TIGHTNESS: 0
SINUS PRESSURE: 0
COUGH: 1
VOMITING: 0
DIARRHEA: 1
ANAL BLEEDING: 0
SORE THROAT: 1
ABDOMINAL PAIN: 0
COLOR CHANGE: 0
BACK PAIN: 0

## 2022-12-20 NOTE — PATIENT INSTRUCTIONS
https://[employer]/pregnancy-riskline//breastfeeding-faqs#:~:text=The%20expectorant%20guaifenesin%20and%20the,okay%20to%20take%20while%20breastfeeding. [employer]. com/conditions/cold-symptoms/can-i-take-cold-medications-while-breastfeeding

## 2022-12-20 NOTE — PROGRESS NOTES
1995    Chief Complaint   Patient presents with    Anxiety    Depression    Eye Pain     Opening Statement:  Patient is a 77-year-old female with a past medical history of gestational diabetes, preeclampsia with severe features in 2022, heart murmur, multiple miscarriages, previous dilation curettage,  section on 2022, and anxiety and depression on Zoloft who we are seeing for a follow up appointment. Anxiety/Depression:  States that her anxiety and depression dramatically improved while she was with her family. Was not able to take her Zoloft for approximately a week while visiting [place] as the Pharmacy did not take her insurance. However, it became worse again after she moved back home. States that living with multiple roommates has increased her stress. Patient states that she will be moving to [place] with her  and baby in approximately six months, which will dramatically decrease her stress. Right Eye Pain:  Patient states that it has completely resolved. Patient attributes transient pain to her eye getting scratched by a contact lens. Also affirms a mild headache which started on 2022. Cold: Affirms rhinorrhea, congestion, sore throat, and productive cough which started on 2022 after flying on 12/15/2022. Does not know what the phlegm looks like. Rhinorrhea was predominately clear. Patient denied any yellow or green mucus. Also affirms mild occipital headache which started on 2022. Patient was counseled that she could take Mucinex and over the counter cold/flu medication. Was counseled to call the office if URI symptoms worsen or if she develops purulent sputum. Was counseled to go the ED if the headache becomes severe. Diarrhea:  Patient states that she had no diarrhea in [place] or [place] but it recurred after coming back to [place]. Recent GI panel completely negative.    Ultrasound of the gallbladder on 10/7/2022 demonstrated cholelithiasis without evidence of acute cholecystitis. Unlikely to be causing diarrhea. Discussed HIDA scan with the patient. States that she would like to defer until she moves to [place] as above, which is reasonable. Obesity:  BMI of 31.54 on 2022. Patient was extensively counseled with regards to healthy diet and exercise. Hyperlipidemia:  Lipid panel on 10/5/2022: Total Cholesterol: 218  Triglycerides: 83  HDL: 39  Calculated LDL: 162        Past Medical History:   Diagnosis Date    Diabetes mellitus ([address]     Gestational diabetes    Heart abnormality     Heart murmur    Infertility, female     history multiple miscarriages    Mental disorder     anxiety       Past Surgical History:   Procedure Laterality Date     SECTION N/A 2022     SECTION performed by Homer De Luna MD at [facility] OR    DILATION AND CURETTAGE OF UTERUS         Current Outpatient Medications   Medication Sig Dispense Refill    sertraline (ZOLOFT) 50 MG tablet Take 1 tablet by mouth daily 30 tablet 2    etonogestrel (NEXPLANON) 68 MG implant implant 1  by subdermal route Q3yrs      tetrahydrozoline (EYE DROPS) 0.05 % ophthalmic solution Place 1 drop into the right eye 3 times daily 10 mL 4    Prenatal MV-Min-Fe Fum-FA-DHA (PRENATAL 1 PO) Take by mouth       No current facility-administered medications for this visit.        Allergies   Allergen Reactions    Pcn [Penicillins]      AS A CHILD       Social History     Socioeconomic History    Marital status:      Spouse name: Not on file    Number of children: Not on file    Years of education: Not on file    Highest education level: Not on file   Occupational History    Not on file   Tobacco Use    Smoking status: Never    Smokeless tobacco: Never   Vaping Use    Vaping Use: Never used   Substance and Sexual Activity    Alcohol use: Yes     Comment: socially    Drug use: Never    Sexual activity: Yes     Partners: Male   Other Topics Concern Not on file   Social History Narrative    Not on file     Social Determinants of Health     Financial Resource Strain: Low Risk     Difficulty of Paying Living Expenses: Not hard at all   Food Insecurity: No Food Insecurity    Worried About Running Out of Food in the Last Year: Never true    Ran Out of Food in the Last Year: Never true   Transportation Needs: Not on file   Physical Activity: Not on file   Stress: Not on file   Social Connections: Not on file   Intimate Partner Violence: Not on file   Housing Stability: Not on file       Family History   Problem Relation Age of Onset    Diabetes Mother     Depression Mother     Diabetes Father     Hypertension Father        Review of Systems   Constitutional:  Negative for chills and fever. HENT:  Positive for congestion, rhinorrhea and sore throat. Negative for postnasal drip, sinus pressure, sinus pain and trouble swallowing. Eyes:  Positive for itching. Negative for pain and visual disturbance. Respiratory:  Positive for cough (Productive, does not know what it looks like. ). Negative for chest tightness, shortness of breath and wheezing. Cardiovascular:  Negative for chest pain, palpitations and leg swelling. Gastrointestinal:  Positive for diarrhea. Negative for abdominal pain, anal bleeding, constipation, nausea and vomiting. Endocrine: Positive for heat intolerance (Hot flashes). Negative for cold intolerance. Genitourinary:  Negative for difficulty urinating, dysuria, hematuria, vaginal bleeding (Not having periods), vaginal discharge and vaginal pain. Musculoskeletal:  Negative for back pain and neck pain. Skin:  Negative for color change, rash and wound. Allergic/Immunologic: Negative for environmental allergies. Neurological:  Positive for headaches. Negative for dizziness and weakness. Hematological:  Does not bruise/bleed easily. Psychiatric/Behavioral:  Positive for dysphoric mood. Negative for self-injury and suicidal ideas. The patient is nervous/anxious. Blood pressure 118/84, pulse 98, temperature 97.3 °F (36.3 °C), weight 143 lb 3.2 oz (65 kg), SpO2 97 %, unknown if currently breastfeeding. Physical Exam  Constitutional:       General: She is not in acute distress. Appearance: She is obese. She is not ill-appearing or toxic-appearing. HENT:      Head: Normocephalic and atraumatic. Comments: No erythema or bulging of the tympanic membrane. Mild erythema of the ear canal.      Right Ear: External ear normal. There is no impacted cerumen. Left Ear: External ear normal. There is no impacted cerumen. Mouth/Throat:      Mouth: Mucous membranes are moist.      Pharynx: Oropharynx is clear. Eyes:      General: No scleral icterus. Pupils: Pupils are equal, round, and reactive to light. Cardiovascular:      Rate and Rhythm: Normal rate and regular rhythm. Pulses: Normal pulses. Heart sounds: Normal heart sounds. Pulmonary:      Effort: Pulmonary effort is normal. No respiratory distress. Breath sounds: Normal breath sounds. No wheezing or rales. Abdominal:      General: Abdomen is flat. There is no distension. Palpations: Abdomen is soft. Tenderness: There is no abdominal tenderness. There is no guarding. Musculoskeletal:         General: Normal range of motion. Skin:     General: Skin is warm and dry. Capillary Refill: Capillary refill takes less than 2 seconds. Neurological:      Mental Status: She is alert. GCS: GCS eye subscore is 4. GCS verbal subscore is 5. GCS motor subscore is 6. Psychiatric:         Mood and Affect: Mood normal.         Behavior: Behavior normal.         Thought Content: Thought content normal.         Judgment: Judgment normal.        Diagnostic data:   I have reviewed recent diagnostic testing including labs with patient today. Assessment and Plan:     Diagnosis Orders   1. Anxiety  sertraline (ZOLOFT) 50 MG tablet      2. Moderate mixed hyperlipidemia not requiring statin therapy        3. Other depression        4. Viral URI          1. Anxiety and Depression, unspecified. States that her anxiety and depression dramatically improved while she was with her family. Was not able to take her Zoloft for approximately a week while visiting [place] as the Pharmacy did not take her insurance. However, it became worse again after she moved back home. States that living with multiple roommates has increased her stress. Patient states that she will be moving to [place] with her  and baby in approximately six months, which will dramatically decrease her stress. Will maintain on Zoloft and switch SSRIs after she is done breastfeeding. Change in living situation may also improve anxiety and depression. 2. Moderate Mixed Hyperlipidemia Not Requiring Statin Therapy. Lipid panel on 10/5/2022: Total Cholesterol: 218  Triglycerides: 83  HDL: 39  Calculated LDL: 162    No statin is recommended given patient's young age and lack of risk factors. Patient was extensively counseled with regards to good dietary and exercise habits. Yearly lipid panel. 3. Viral URI. Affirms rhinorrhea, congestion, sore throat, and productive cough which started on 12/16/2022 after flying on 12/15/2022. Rhinorrhea was predominately clear. Patient denied any yellow or green mucus. Does not know what the phlegm looks like. Also affirms mild occipital headache which started on 12/19/2022. Patient was counseled that she could take Mucinex and over the counter cold/flu medication. Was counseled to call the office if URI symptoms worsen or if she develops purulent sputum. Was counseled to go the ED if the headache becomes severe. 4. Diarrhea, unspecified. Patient states that she had no diarrhea in [place] or [place] but it recurred after coming back to [place]. Recent GI panel completely negative.    Ultrasound of the gallbladder on 10/7/2022 demonstrated cholelithiasis without evidence of acute cholecystitis. Unlikely to be causing diarrhea. Discussed HIDA scan with the patient. States that she would like to defer until she moves to [facility] as above, which is reasonable. Staffed with: Dr. Russell Bell.  [address]  Dept: [phone]  Dept Fax: [phone] : [phone]

## 2023-03-08 ENCOUNTER — OFFICE VISIT (OUTPATIENT)
Dept: INTERNAL MEDICINE CLINIC | Age: 28
End: 2023-03-08
Payer: MEDICAID

## 2023-03-08 VITALS
OXYGEN SATURATION: 98 % | WEIGHT: 144.2 LBS | DIASTOLIC BLOOD PRESSURE: 82 MMHG | SYSTOLIC BLOOD PRESSURE: 128 MMHG | TEMPERATURE: 98.2 F | BODY MASS INDEX: 31.76 KG/M2 | HEART RATE: 84 BPM

## 2023-03-08 DIAGNOSIS — F41.9 ANXIETY: Primary | ICD-10-CM

## 2023-03-08 DIAGNOSIS — R07.9 CHEST PAIN, UNSPECIFIED TYPE: ICD-10-CM

## 2023-03-08 PROCEDURE — 93000 ELECTROCARDIOGRAM COMPLETE: CPT | Performed by: STUDENT IN AN ORGANIZED HEALTH CARE EDUCATION/TRAINING PROGRAM

## 2023-03-08 PROCEDURE — 99213 OFFICE O/P EST LOW 20 MIN: CPT | Performed by: STUDENT IN AN ORGANIZED HEALTH CARE EDUCATION/TRAINING PROGRAM

## 2023-03-08 RX ORDER — BUSPIRONE HYDROCHLORIDE 10 MG/1
10 TABLET ORAL 2 TIMES DAILY
Qty: 60 TABLET | Refills: 0 | Status: SHIPPED | OUTPATIENT
Start: 2023-03-08 | End: 2023-04-07

## 2023-03-08 RX ORDER — BUSPIRONE HYDROCHLORIDE 7.5 MG/1
1 TABLET ORAL
COMMUNITY
Start: 2023-03-07 | End: 2023-03-08 | Stop reason: DRUGHIGH

## 2023-03-08 RX ORDER — ESCITALOPRAM OXALATE 10 MG/1
TABLET ORAL
COMMUNITY
Start: 2023-03-07

## 2023-03-08 RX ORDER — NITROFURANTOIN 25; 75 MG/1; MG/1
CAPSULE ORAL
COMMUNITY
Start: 2023-03-07

## 2023-03-08 SDOH — ECONOMIC STABILITY: FOOD INSECURITY: WITHIN THE PAST 12 MONTHS, THE FOOD YOU BOUGHT JUST DIDN'T LAST AND YOU DIDN'T HAVE MONEY TO GET MORE.: NEVER TRUE

## 2023-03-08 SDOH — ECONOMIC STABILITY: INCOME INSECURITY: HOW HARD IS IT FOR YOU TO PAY FOR THE VERY BASICS LIKE FOOD, HOUSING, MEDICAL CARE, AND HEATING?: NOT HARD AT ALL

## 2023-03-08 SDOH — ECONOMIC STABILITY: FOOD INSECURITY: WITHIN THE PAST 12 MONTHS, YOU WORRIED THAT YOUR FOOD WOULD RUN OUT BEFORE YOU GOT MONEY TO BUY MORE.: NEVER TRUE

## 2023-03-08 SDOH — ECONOMIC STABILITY: HOUSING INSECURITY
IN THE LAST 12 MONTHS, WAS THERE A TIME WHEN YOU DID NOT HAVE A STEADY PLACE TO SLEEP OR SLEPT IN A SHELTER (INCLUDING NOW)?: NO

## 2023-03-08 ASSESSMENT — PATIENT HEALTH QUESTIONNAIRE - PHQ9
3. TROUBLE FALLING OR STAYING ASLEEP: 1
6. FEELING BAD ABOUT YOURSELF - OR THAT YOU ARE A FAILURE OR HAVE LET YOURSELF OR YOUR FAMILY DOWN: 1
SUM OF ALL RESPONSES TO PHQ QUESTIONS 1-9: 8
SUM OF ALL RESPONSES TO PHQ QUESTIONS 1-9: 8
7. TROUBLE CONCENTRATING ON THINGS, SUCH AS READING THE NEWSPAPER OR WATCHING TELEVISION: 1
2. FEELING DOWN, DEPRESSED OR HOPELESS: 1
9. THOUGHTS THAT YOU WOULD BE BETTER OFF DEAD, OR OF HURTING YOURSELF: 0
5. POOR APPETITE OR OVEREATING: 1
SUM OF ALL RESPONSES TO PHQ9 QUESTIONS 1 & 2: 2
10. IF YOU CHECKED OFF ANY PROBLEMS, HOW DIFFICULT HAVE THESE PROBLEMS MADE IT FOR YOU TO DO YOUR WORK, TAKE CARE OF THINGS AT HOME, OR GET ALONG WITH OTHER PEOPLE: 0
8. MOVING OR SPEAKING SO SLOWLY THAT OTHER PEOPLE COULD HAVE NOTICED. OR THE OPPOSITE, BEING SO FIGETY OR RESTLESS THAT YOU HAVE BEEN MOVING AROUND A LOT MORE THAN USUAL: 1
1. LITTLE INTEREST OR PLEASURE IN DOING THINGS: 1
4. FEELING TIRED OR HAVING LITTLE ENERGY: 1
SUM OF ALL RESPONSES TO PHQ QUESTIONS 1-9: 8
SUM OF ALL RESPONSES TO PHQ QUESTIONS 1-9: 8

## 2023-03-08 NOTE — PROGRESS NOTES
[address] [facility] Internal Medicine   [facility] [address], [place]  Dept: [phone]  Dept Fax: [phone]    This is an established patient, here for an evaluation of the following chief complaint(s):  Depression, Hyperlipidemia, and Anxiety    ASSESSMENT AND PLAN     1. Anxiety    2. Chest pain, unspecified type      # Anxiety Disorder -has some baseline anxiety which has been exacerbated by recent moving and having a baby. I think she is originally from [place] or [place] based on chart review. Does not meet DSM-V criteria for panic disorder. Anxiety gets better when she is with her family. Prior Tx/Therapy: Zoloft 50 mg qd (no therapeutic benefit)  Current Tx/Therapy: Lexapro 10 mg qd, BuSpar 7.5 mg BID, medical marijuana at nighttime    -She was previously on Zoloft which the patient states did not do much for her. She was switched to Lexapro by her OB/GYN and started on BuSpar 7.5 mg twice daily as well. Her first dose of Lexapro was earlier today  -Discussed that BuSpar is usually effective at higher doses and with more dosing intervals. I increased her BuSpar dose to 10 mg twice daily to help control her anxiety symptoms. Did discuss PRN Xanax however will first determine if change to Lexapro with BuSpar dosage increase will help control her anxiety   -She is currently using medical marijuana and edibles at night to help control her anxiety    Plan: Agree with Lexapro change by OB/GYN, will continue this medication at 10 mg daily. Increase BuSpar to 10 mg BID for better therapeutic benefit. Discussed risks/benefits of medical marijuana with patient. # Chest Pain, Unspecified -patient reported chest pain that occurs with deep inspiration. No pleural friction rub on auscultation. No other anginal equivalent symptoms. Has been ongoing for a while now - not new. Obtained EKG in office which was negative for acute ischemic changes. No global ST changes.  Low pretest probability for ACS. Plan: Will conservatively manage for now. Patient instructed to go to the ED if there is any worsening changes in her CP or if she develops any new symptoms. Orders Placed This Encounter   Procedures    EKG 12 Lead     New Prescriptions    BUSPIRONE (BUSPAR) 10 MG TABLET    Take 1 tablet by mouth 2 times daily     The risks and benefits of my recommendations, as well as other treatment options (if indicated) were discussed with the patient today. Questions were answered. Follow up: 3 months and as needed. HPI     Lucia Faustin (1995) is a pleasant 32 y.o. female here for a follow up visit for problems stated above. This is my first visit with this patient. Interval History & Review of Systems  Just started taking the Lexapro today by OB/GYN   Was started on BuSpar as well, but on 7.5 mg BID. This needs increased to 10 mg BID for therapeutic benefit   Taking CBD and edibles - smoked and edibles. Helps her at night to deal with anxiety  Gets panic attacks now. Does not meet DSM-V criteria for panic disorder   Did discuss brief use of PRN Xanax --> She states she has tried them on flights before with no real therapeutic effect.  Will  Has CP right now --> Related to anxiety? --> occurs on deep inspiration / pleurisy   EKG is normal   Discussed she should go to the ED if CP worsens  Low suspicion for ACS / low pretest probability     Current Medications:  Outpatient Medications Marked as Taking for the 3/8/23 encounter (Office Visit) with Fran Thomas MD   Medication Sig Dispense Refill    escitalopram (LEXAPRO) 10 MG tablet       nitrofurantoin, macrocrystal-monohydrate, (MACROBID) 100 MG capsule       busPIRone (BUSPAR) 10 MG tablet Take 1 tablet by mouth 2 times daily 60 tablet 0    etonogestrel (NEXPLANON) 68 MG implant implant 1  by subdermal route Q3yrs       PMH: has a past medical history of Diabetes mellitus ([address] Heart abnormality, Infertility, female, and Mental disorder. PSH: has a past surgical history that includes Dilation and curettage of uterus and  section (N/A, 2022). FH:family history includes Depression in her mother; Diabetes in her father and mother; Hypertension in her father. SH: reports that she has never smoked. She has never used smokeless tobacco. She reports current alcohol use. She reports that she does not use drugs. Allergies:is allergic to pcn [penicillins]. PHYSICAL EXAMINATION     Vitals:    23 1350   BP: 128/82   Pulse: 84   Temp: 98.2 °F (36.8 °C)   SpO2: 98%     Body mass index is 31.76 kg/m². General appearance: alert, well appearing, and in no distress and oriented to person, place, and time. Head; normocephalic, atraumatic. KAYE. ENT- ENT exam normal, no neck nodes or sinus tenderness. Oropharyngeal exam - mucous membranes moist, pharynx normal without lesions. Neck exam - supple, no significant adenopathy. CVS exam: normal rate, regular rhythm, normal S1, S2, no murmurs, rubs, clicks or gallops. Chest: clear to auscultation, no wheezes, rales or rhonchi, symmetric air entry. Abdominal exam: soft, nontender, nondistended, no masses or organomegaly. Exam of extremities: peripheral pulses normal, no pedal edema, no clubbing or cyanosis  Musculoskeletal exam: no joint tenderness, deformity or swelling. Neurological exam reveals alert, oriented, normal speech, no focal findings or movement disorder noted. Skin exam - normal coloration and turgor, no rashes, no suspicious skin lesions noted. Mental Status: alert, oriented to person, place, and time. MOST RECENT DIAGNOSTIC DATA     The Following Labs Were Reviewed Today:  N/A    No results found for this or any previous visit (from the past 24 hour(s)). Radiology last 30 days:  No results found. Electronically signed by Gideon Contreras.  Gerardo Rabago M.D. on 23 at 2:37 PM EST   Internal Medicine Resident PGY-3  [address] [facility] [address], [address]  Case and plan reviewed with attending physician, Dr. Ella Chadwick DO.

## 2023-04-24 ENCOUNTER — HOSPITAL ENCOUNTER (EMERGENCY)
Age: 28
Discharge: HOME | End: 2023-04-24
Payer: MEDICAID

## 2023-04-24 VITALS
WEIGHT: 140 LBS | TEMPERATURE: 99.2 F | SYSTOLIC BLOOD PRESSURE: 104 MMHG | RESPIRATION RATE: 16 BRPM | OXYGEN SATURATION: 97 % | DIASTOLIC BLOOD PRESSURE: 68 MMHG | BODY MASS INDEX: 30.2 KG/M2 | HEART RATE: 102 BPM | HEIGHT: 57 IN

## 2023-04-24 DIAGNOSIS — R11.2 NAUSEA VOMITING AND DIARRHEA: Primary | ICD-10-CM

## 2023-04-24 DIAGNOSIS — R19.7 NAUSEA VOMITING AND DIARRHEA: Primary | ICD-10-CM

## 2023-04-24 LAB
FLUAV AG SPEC QL: NEGATIVE
FLUBV AG SPEC QL: NEGATIVE
SARS-COV-2 RDRP RESP QL NAA+PROBE: NOT  DETECTED

## 2023-04-24 PROCEDURE — 99213 OFFICE O/P EST LOW 20 MIN: CPT | Performed by: NURSE PRACTITIONER

## 2023-04-24 PROCEDURE — 99213 OFFICE O/P EST LOW 20 MIN: CPT

## 2023-04-24 PROCEDURE — 87635 SARS-COV-2 COVID-19 AMP PRB: CPT

## 2023-04-24 PROCEDURE — 87804 INFLUENZA ASSAY W/OPTIC: CPT

## 2023-04-24 RX ORDER — ONDANSETRON 4 MG/1
4 TABLET, ORALLY DISINTEGRATING ORAL 3 TIMES DAILY PRN
Qty: 21 TABLET | Refills: 0 | Status: SHIPPED | OUTPATIENT
Start: 2023-04-24

## 2023-04-24 ASSESSMENT — ENCOUNTER SYMPTOMS
RHINORRHEA: 0
NAUSEA: 1
VOMITING: 1
SHORTNESS OF BREATH: 0
ABDOMINAL PAIN: 0
DIARRHEA: 1
SORE THROAT: 0

## 2023-04-24 ASSESSMENT — PAIN - FUNCTIONAL ASSESSMENT: PAIN_FUNCTIONAL_ASSESSMENT: 0-10

## 2023-04-24 ASSESSMENT — PAIN SCALES - GENERAL: PAINLEVEL_OUTOF10: 8

## 2023-04-24 NOTE — ED NOTES
Pt complains of N/V/D that started this am along with bodyaches. States she has had multiple episodes of diarrhea today, but only vomited once at about 9 am. States she is able to keep down fluids.       Linda Cheng, CHIOMA  04/24/23 0643

## 2024-12-07 NOTE — ED PROVIDER NOTES
[facility]  Urgent Care Encounter       CHIEF COMPLAINT       Chief Complaint   Patient presents with    Emesis    Diarrhea    Headache    Generalized Body Aches       Nurses Notes reviewed and I agree except as noted in the HPI. HISTORY OF PRESENT ILLNESS   April Anastacio Gonzalez is a 32 y.o. female who presents for evaluation of nausea, vomiting, diarrhea, headache and ear pain. Patient denies any cough, congestion, runny nose or sore throat. She denies any sick exposures or any severe abdominal pain. She denies any fevers but does report mild chills. She denies any medications interventions at home. Denies any urinary complaints. The history is provided by the patient. REVIEW OF SYSTEMS     Review of Systems   Constitutional:  Negative for chills and fever. HENT:  Positive for ear pain. Negative for congestion, rhinorrhea and sore throat. Respiratory:  Negative for shortness of breath. Cardiovascular:  Negative for chest pain. Gastrointestinal:  Positive for diarrhea, nausea and vomiting. Negative for abdominal pain. Genitourinary:  Negative for dysuria. Musculoskeletal:  Negative for myalgias. Skin:  Negative for rash. Allergic/Immunologic: Negative for immunocompromised state. Neurological:  Positive for headaches. PAST MEDICAL HISTORY         Diagnosis Date    Diabetes mellitus ([facility] [address]     Gestational diabetes    Heart abnormality     Heart murmur    Infertility, female     history multiple miscarriages    Mental disorder     anxiety       SURGICALHISTORY     Patient  has a past surgical history that includes Dilation and curettage of uterus and  section (N/A, 2022). CURRENT MEDICATIONS       Previous Medications    ETONOGESTREL (NEXPLANON) 68 MG IMPLANT    implant 1  by subdermal route Q3yrs       ALLERGIES     Patient is is allergic to pcn [penicillins].     Patients There is no immunization history for the selected administration types on Opt out

## (undated) DEVICE — Z DISCONTINUED GLOVE SURG SZ 7 L12IN FNGR THK13MIL WHT ISOLEX POLYISOPRENE

## (undated) DEVICE — PACK PROCEDURE SURG C SECT SRMC LF

## (undated) DEVICE — GLOVE SURG SZ 65 THK91MIL LTX FREE SYN POLYISOPRENE

## (undated) DEVICE — SUTURE VCRL SZ 3-0 L18IN ABSRB VLT L26MM SH 1/2 CIR J774D

## (undated) DEVICE — SUTURE VCRL + SZ 0 L36IN ABSRB VLT L36MM CT-1 1/2 CIR VCP346H

## (undated) DEVICE — SUTURE VCRL SZ 1 L36IN ABSRB UD CTX L48MM 1/2 CIR J977H

## (undated) DEVICE — SUTURE ABSRB MFIL VLT CT 3-0 - 27IN PDS II Z338H

## (undated) DEVICE — APPLICATOR PREP 26ML 0.7% IOD POVACRYLEX 74% ISO ALC ST